# Patient Record
Sex: FEMALE | Race: BLACK OR AFRICAN AMERICAN | NOT HISPANIC OR LATINO | Employment: UNEMPLOYED | URBAN - METROPOLITAN AREA
[De-identification: names, ages, dates, MRNs, and addresses within clinical notes are randomized per-mention and may not be internally consistent; named-entity substitution may affect disease eponyms.]

---

## 2019-09-03 DIAGNOSIS — Z76.82 ORGAN TRANSPLANT CANDIDATE: Primary | ICD-10-CM

## 2019-09-03 PROBLEM — Z21 HUMAN IMMUNODEFICIENCY VIRUS (HIV) INFECTION: Status: ACTIVE | Noted: 2018-02-23

## 2019-09-03 PROBLEM — B20 HUMAN IMMUNODEFICIENCY VIRUS (HIV) INFECTION: Status: ACTIVE | Noted: 2018-02-23

## 2019-09-03 PROBLEM — Z98.890 S/P PARATHYROIDECTOMY: Status: ACTIVE | Noted: 2018-02-23

## 2019-09-03 PROBLEM — Z94.0 HISTORY OF KIDNEY TRANSPLANT: Status: ACTIVE | Noted: 2018-02-23

## 2019-09-03 PROBLEM — K65.9 PERITONITIS: Status: ACTIVE | Noted: 2019-09-03

## 2019-09-03 PROBLEM — N18.6 END-STAGE RENAL DISEASE: Status: ACTIVE | Noted: 2018-03-23

## 2019-09-03 PROBLEM — Z90.89 S/P PARATHYROIDECTOMY: Status: ACTIVE | Noted: 2018-02-23

## 2019-09-11 ENCOUNTER — TELEPHONE (OUTPATIENT)
Dept: TRANSPLANT | Facility: CLINIC | Age: 39
End: 2019-09-11

## 2019-09-17 ENCOUNTER — TELEPHONE (OUTPATIENT)
Dept: TRANSPLANT | Facility: CLINIC | Age: 39
End: 2019-09-17

## 2019-10-07 ENCOUNTER — TELEPHONE (OUTPATIENT)
Dept: TRANSPLANT | Facility: CLINIC | Age: 39
End: 2019-10-07

## 2022-05-02 ENCOUNTER — HOSPITAL ENCOUNTER (EMERGENCY)
Facility: HOSPITAL | Age: 42
Discharge: ANOTHER HOSPITAL | End: 2022-05-02
Attending: EMERGENCY MEDICINE
Payer: MEDICARE

## 2022-05-02 VITALS
SYSTOLIC BLOOD PRESSURE: 79 MMHG | WEIGHT: 165.69 LBS | OXYGEN SATURATION: 96 % | BODY MASS INDEX: 26.01 KG/M2 | HEART RATE: 63 BPM | DIASTOLIC BLOOD PRESSURE: 43 MMHG | HEIGHT: 67 IN | RESPIRATION RATE: 16 BRPM | TEMPERATURE: 98 F

## 2022-05-02 DIAGNOSIS — Z99.2 CHRONIC KIDNEY DISEASE WITH END STAGE RENAL FAILURE ON DIALYSIS: ICD-10-CM

## 2022-05-02 DIAGNOSIS — E86.1 HYPOTENSION DUE TO HYPOVOLEMIA: Primary | ICD-10-CM

## 2022-05-02 DIAGNOSIS — N18.6 CHRONIC KIDNEY DISEASE WITH END STAGE RENAL FAILURE ON DIALYSIS: ICD-10-CM

## 2022-05-02 DIAGNOSIS — E87.6 HYPOKALEMIA: ICD-10-CM

## 2022-05-02 LAB
ALBUMIN SERPL BCP-MCNC: 2.4 G/DL (ref 3.5–5)
ALBUMIN/GLOB SERPL: 0.5 {RATIO}
ALP SERPL-CCNC: 53 U/L (ref 37–98)
ALT SERPL W P-5'-P-CCNC: 10 U/L (ref 13–56)
ANION GAP SERPL CALCULATED.3IONS-SCNC: 13 MMOL/L (ref 7–16)
AST SERPL W P-5'-P-CCNC: 20 U/L (ref 15–37)
BASOPHILS # BLD AUTO: 0.02 K/UL (ref 0–0.2)
BASOPHILS NFR BLD AUTO: 0.4 % (ref 0–1)
BILIRUB SERPL-MCNC: 0.3 MG/DL (ref 0–1.2)
BUN SERPL-MCNC: 24 MG/DL (ref 7–18)
BUN/CREAT SERPL: 2 (ref 6–20)
CALCIUM SERPL-MCNC: 8.9 MG/DL (ref 8.5–10.1)
CHLORIDE SERPL-SCNC: 98 MMOL/L (ref 98–107)
CO2 SERPL-SCNC: 28 MMOL/L (ref 21–32)
CREAT SERPL-MCNC: 13.35 MG/DL (ref 0.55–1.02)
DIFFERENTIAL METHOD BLD: ABNORMAL
EOSINOPHIL # BLD AUTO: 0.49 K/UL (ref 0–0.5)
EOSINOPHIL NFR BLD AUTO: 10.1 % (ref 1–4)
ERYTHROCYTE [DISTWIDTH] IN BLOOD BY AUTOMATED COUNT: 16.9 % (ref 11.5–14.5)
FLUAV AG UPPER RESP QL IA.RAPID: NEGATIVE
FLUBV AG UPPER RESP QL IA.RAPID: NEGATIVE
GLOBULIN SER-MCNC: 4.8 G/DL (ref 2–4)
GLUCOSE SERPL-MCNC: 84 MG/DL (ref 74–106)
HCT VFR BLD AUTO: 38 % (ref 38–47)
HGB BLD-MCNC: 11.8 G/DL (ref 12–16)
IMM GRANULOCYTES # BLD AUTO: 0.02 K/UL (ref 0–0.04)
IMM GRANULOCYTES NFR BLD: 0.4 % (ref 0–0.4)
LACTATE SERPL-SCNC: 1.1 MMOL/L (ref 0.4–2)
LYMPHOCYTES # BLD AUTO: 1.81 K/UL (ref 1–4.8)
LYMPHOCYTES NFR BLD AUTO: 37.3 % (ref 27–41)
MAGNESIUM SERPL-MCNC: 2.3 MG/DL (ref 1.7–2.3)
MCH RBC QN AUTO: 30 PG (ref 27–31)
MCHC RBC AUTO-ENTMCNC: 31.1 G/DL (ref 32–36)
MCV RBC AUTO: 96.7 FL (ref 80–96)
MONOCYTES # BLD AUTO: 0.52 K/UL (ref 0–0.8)
MONOCYTES NFR BLD AUTO: 10.7 % (ref 2–6)
MPC BLD CALC-MCNC: 10.1 FL (ref 9.4–12.4)
NEUTROPHILS # BLD AUTO: 1.99 K/UL (ref 1.8–7.7)
NEUTROPHILS NFR BLD AUTO: 41.1 % (ref 53–65)
PLATELET # BLD AUTO: 232 K/UL (ref 150–400)
POTASSIUM SERPL-SCNC: 2.4 MMOL/L (ref 3.5–5.1)
PROT SERPL-MCNC: 7.2 G/DL (ref 6.4–8.2)
RBC # BLD AUTO: 3.93 M/UL (ref 4.2–5.4)
SARS-COV+SARS-COV-2 AG RESP QL IA.RAPID: NEGATIVE
SODIUM SERPL-SCNC: 137 MMOL/L (ref 136–145)
WBC # BLD AUTO: 4.85 K/UL (ref 4.5–11)

## 2022-05-02 PROCEDURE — 96365 THER/PROPH/DIAG IV INF INIT: CPT

## 2022-05-02 PROCEDURE — 93010 ELECTROCARDIOGRAM REPORT: CPT | Mod: ,,, | Performed by: EMERGENCY MEDICINE

## 2022-05-02 PROCEDURE — 93010 EKG 12-LEAD: ICD-10-PCS | Mod: ,,, | Performed by: EMERGENCY MEDICINE

## 2022-05-02 PROCEDURE — 80053 COMPREHEN METABOLIC PANEL: CPT | Performed by: EMERGENCY MEDICINE

## 2022-05-02 PROCEDURE — 96375 TX/PRO/DX INJ NEW DRUG ADDON: CPT

## 2022-05-02 PROCEDURE — 99284 EMERGENCY DEPT VISIT MOD MDM: CPT | Performed by: EMERGENCY MEDICINE

## 2022-05-02 PROCEDURE — 96361 HYDRATE IV INFUSION ADD-ON: CPT

## 2022-05-02 PROCEDURE — 83735 ASSAY OF MAGNESIUM: CPT | Performed by: EMERGENCY MEDICINE

## 2022-05-02 PROCEDURE — 99285 EMERGENCY DEPT VISIT HI MDM: CPT | Mod: 25,CS

## 2022-05-02 PROCEDURE — 36415 COLL VENOUS BLD VENIPUNCTURE: CPT | Performed by: EMERGENCY MEDICINE

## 2022-05-02 PROCEDURE — 87040 BLOOD CULTURE FOR BACTERIA: CPT | Performed by: EMERGENCY MEDICINE

## 2022-05-02 PROCEDURE — 36592 COLLECT BLOOD FROM PICC: CPT | Performed by: EMERGENCY MEDICINE

## 2022-05-02 PROCEDURE — 25000003 PHARM REV CODE 250: Performed by: EMERGENCY MEDICINE

## 2022-05-02 PROCEDURE — 83605 ASSAY OF LACTIC ACID: CPT | Performed by: EMERGENCY MEDICINE

## 2022-05-02 PROCEDURE — 63600175 PHARM REV CODE 636 W HCPCS: Performed by: EMERGENCY MEDICINE

## 2022-05-02 PROCEDURE — 93005 ELECTROCARDIOGRAM TRACING: CPT

## 2022-05-02 PROCEDURE — 87428 SARSCOV & INF VIR A&B AG IA: CPT | Performed by: EMERGENCY MEDICINE

## 2022-05-02 PROCEDURE — 85025 COMPLETE CBC W/AUTO DIFF WBC: CPT | Performed by: EMERGENCY MEDICINE

## 2022-05-02 RX ORDER — POTASSIUM CHLORIDE 20 MEQ/1
20 TABLET, EXTENDED RELEASE ORAL
Status: COMPLETED | OUTPATIENT
Start: 2022-05-02 | End: 2022-05-02

## 2022-05-02 RX ORDER — POTASSIUM CHLORIDE 20 MEQ/1
20 TABLET, EXTENDED RELEASE ORAL DAILY
Qty: 30 TABLET | Refills: 0 | Status: SHIPPED | OUTPATIENT
Start: 2022-05-02 | End: 2022-05-02 | Stop reason: ALTCHOICE

## 2022-05-02 RX ORDER — ONDANSETRON 2 MG/ML
4 INJECTION INTRAMUSCULAR; INTRAVENOUS
Status: COMPLETED | OUTPATIENT
Start: 2022-05-02 | End: 2022-05-02

## 2022-05-02 RX ORDER — POTASSIUM CHLORIDE 7.45 MG/ML
10 INJECTION INTRAVENOUS
Status: COMPLETED | OUTPATIENT
Start: 2022-05-02 | End: 2022-05-02

## 2022-05-02 RX ORDER — SODIUM CHLORIDE AND POTASSIUM CHLORIDE 150; 900 MG/100ML; MG/100ML
INJECTION, SOLUTION INTRAVENOUS
Status: COMPLETED | OUTPATIENT
Start: 2022-05-02 | End: 2022-05-03

## 2022-05-02 RX ADMIN — SODIUM CHLORIDE AND POTASSIUM CHLORIDE: .9; .15 SOLUTION INTRAVENOUS at 11:05

## 2022-05-02 RX ADMIN — ONDANSETRON 4 MG: 2 INJECTION INTRAMUSCULAR; INTRAVENOUS at 10:05

## 2022-05-02 RX ADMIN — POTASSIUM CHLORIDE 20 MEQ: 20 TABLET, EXTENDED RELEASE ORAL at 10:05

## 2022-05-02 RX ADMIN — SODIUM CHLORIDE 1000 ML: 9 INJECTION, SOLUTION INTRAVENOUS at 08:05

## 2022-05-02 RX ADMIN — POTASSIUM CHLORIDE 10 MEQ: 7.46 INJECTION, SOLUTION INTRAVENOUS at 09:05

## 2022-05-03 NOTE — ED PROVIDER NOTES
Encounter Date: 2022       History     Chief Complaint   Patient presents with    Hypotension     Patient presents with a 45 day history of recurrent nausea sometimes with vomiting.  States her blood pressure is always low in the range of 60-70 systolic and 30-40  diastolic.  She states she does peritoneal dialysis at home and was told she has peritonitis.  She states she is being treated with antibiotics.  States she saw her nephrologist 4 days ago and they did blood cultures and culture of the peritoneal fluid, but she is not feeling any better.  I asked her if something had changed today that prompted her to come to the emergency department and she states there has been no change over the past 45 days in her symptoms.  Patient sees Dr. Zachary Lemos for Nephrology.        Review of patient's allergies indicates:   Allergen Reactions    Ceftazidime Itching    Iron sucrose Itching    Sodium ferric gluconat-sucrose Itching    Cefazolin Itching    Gentamicin Itching     Not allergic     Iron Itching    Soap Hives    Sodium ferric gluconate complex Itching    Sucrose Itching    Adhesive Hives and Rash     No surgical tape    Povidone-iodine Itching, Rash and Hives     Burning to skin  blisters       Past Medical History:   Diagnosis Date    ESRD on peritoneal dialysis     Human immunodeficiency virus (HIV) disease     Renal disorder      Past Surgical History:   Procedure Laterality Date     SECTION      DNC      THYROIDECTOMY       History reviewed. No pertinent family history.  Social History     Tobacco Use    Smoking status: Former Smoker    Smokeless tobacco: Never Used   Substance Use Topics    Alcohol use: Not Currently     Review of Systems   Constitutional: Positive for fatigue. Negative for activity change, appetite change, chills, diaphoresis and fever.   HENT: Negative.    Eyes: Negative.    Respiratory: Negative.    Cardiovascular: Negative.    Gastrointestinal: Positive  for nausea and vomiting. Negative for abdominal distention, abdominal pain, blood in stool, constipation and diarrhea.   Genitourinary: Negative for flank pain, pelvic pain, vaginal bleeding, vaginal discharge and vaginal pain.        Patient does not make any urine.   Musculoskeletal: Negative.    Skin: Negative.    Neurological: Negative.    Hematological: Negative.         Patient states she was anemic in the past but her blood count has been good lately.   Psychiatric/Behavioral: Negative.    All other systems reviewed and are negative.      Physical Exam     Initial Vitals [05/02/22 2020]   BP Pulse Resp Temp SpO2   (!) 68/36 86 16 97.7 °F (36.5 °C) 99 %      MAP       --         Physical Exam    Nursing note and vitals reviewed.  Constitutional: She appears well-developed and well-nourished. She is not diaphoretic. No distress.   HENT:   Head: Normocephalic.   Right Ear: External ear normal.   Left Ear: External ear normal.   Nose: Nose normal.   Mouth/Throat: Oropharynx is clear and moist. No oropharyngeal exudate.   Eyes: Conjunctivae and EOM are normal. Pupils are equal, round, and reactive to light. Right eye exhibits no discharge. Left eye exhibits no discharge. No scleral icterus.   Neck: Neck supple. No tracheal deviation present. No JVD present.   Normal range of motion.  Cardiovascular: Normal rate, regular rhythm, normal heart sounds and intact distal pulses.   No murmur heard.  Pulmonary/Chest: Breath sounds normal. No stridor. No respiratory distress. She has no wheezes. She has no rhonchi. She has no rales.   Abdominal: Abdomen is soft. Bowel sounds are normal. She exhibits no distension and no mass. There is no abdominal tenderness.   Patient has an umbilical hernia which is not reducible but is not tender. There is no rebound and no guarding.   Musculoskeletal:      Cervical back: Normal range of motion and neck supple.     Lymphadenopathy:     She has no cervical adenopathy.   Skin: Skin is  warm and dry. Capillary refill takes less than 2 seconds. No rash noted. No erythema. No pallor.         Medical Screening Exam   See Full Note    ED Course   Procedures  Labs Reviewed   COMPREHENSIVE METABOLIC PANEL - Abnormal; Notable for the following components:       Result Value    Potassium 2.4 (*)     BUN 24 (*)     Creatinine 13.35 (*)     BUN/Creatinine Ratio 2 (*)     Albumin 2.4 (*)     Globulin 4.8 (*)     ALT 10 (*)     eGFR 3 (*)     All other components within normal limits   CBC WITH DIFFERENTIAL - Abnormal; Notable for the following components:    RBC 3.93 (*)     Hemoglobin 11.8 (*)     MCV 96.7 (*)     MCHC 31.1 (*)     RDW 16.9 (*)     Neutrophils % 41.1 (*)     Monocytes % 10.7 (*)     Eosinophils % 10.1 (*)     All other components within normal limits   MAGNESIUM - Normal   LACTIC ACID, PLASMA - Normal   SARS-COV2 (COVID) W/ FLU ANTIGEN - Normal    Narrative:     Negative SARS-CoV results should not be used as the sole basis for treatment or patient management decisions; negative results should be considered in the context of a patient's recent exposures, history and the presene of clinical signs and symptoms consistent with COVID-19.  Negative results should be treated as presumptive and confirmed by molecular assay, if necessary for patient management.   CULTURE, BLOOD   CULTURE, BLOOD   CBC W/ AUTO DIFFERENTIAL    Narrative:     The following orders were created for panel order CBC auto differential.  Procedure                               Abnormality         Status                     ---------                               -----------         ------                     CBC with Differential[[id number]]        Abnormal            Final result                 Please view results for these tests on the individual orders.        ECG Results          EKG 12-lead (Final result)  Result time 05/02/22 22:36:34    Final result by Interface, Lab In [facility] (05/02/22 22:36:34)                  Narrative:    Test Reason : E87.6,    Vent. Rate : 064 BPM     Atrial Rate : 064 BPM     P-R Int : 160 ms          QRS Dur : 090 ms      QT Int : 398 ms       P-R-T Axes : 066 057 062 degrees     QTc Int : 410 ms    Sinus rhythm with occasional Premature ventricular complexes  Low voltage QRS  T wave abnormality, consider inferior ischemia  Abnormal ECG  No previous ECGs available  Confirmed by Donnell Jones DO ([id number]) on 5/2/2022 10:36:24 PM    Referred By: [id number]   SELF           Confirmed By:Donnell Jones DO                            Imaging Results    None          Medications   0.9 % NaCl with KCl 20 mEq infusion (has no administration in time range)   sodium chloride 0.9% bolus 1,000 mL (0 mLs Intravenous Stopped 5/2/22 2201)   potassium chloride 10 mEq in 100 mL IVPB (0 mEq Intravenous Stopped 5/2/22 2235)   potassium chloride SA CR tablet 20 mEq (20 mEq Oral Given 5/2/22 2235)   ondansetron injection 4 mg (4 mg Intravenous Given 5/2/22 2245)     Medical Decision Making:   ED Management:  Patient was given IV potassium and IV fluid.  Attempted to give oral potassium as well and patient vomited and is unable to take oral potassium.  Patient reports she has had the same problem at home where she cannot keep anything down.  At this point patient will need inpatient therapy and consultation with her nephrologist, may need a different form of dialysis or sub correction in her peritoneal dialysis as her creatinine is 13. There is a possibility that the patient is noncompliant at home in doing peritoneal dialysis and may benefit from an alternative therapy.  Defer to Nephrology on these decisions.    Other:   I have discussed this case with another health care provider.       <> Summary of the Discussion: Patient discussed with the transfer Center, accepted by Dr. Whiteside at [facility].                   Clinical Impression:   Final diagnoses:  [E87.6] Hypokalemia  [I95.89, E86.1]  Hypotension due to hypovolemia (Primary)  [N18.6, Z99.2] Chronic kidney disease with end stage renal failure on dialysis          ED Disposition Condition    Transfer to Another Facility Stable              Donnell Jones DO  05/02/22 [id number]       Donnell Jones,   05/02/22 6344

## 2022-05-03 NOTE — ED TRIAGE NOTES
Pt to Er with c/o hypotension states she has been nauseated and vomiting for 1 1/2 months. Pt states she is on peritoneal dialysis and was told she had poradenitis and is on antibiotics but does not know the name of them . Pt states she thinks she is dehydrated pt  Denies pain. Pt does peritoneal dialysis at home.

## 2022-05-05 ENCOUNTER — TELEPHONE (OUTPATIENT)
Dept: EMERGENCY MEDICINE | Facility: HOSPITAL | Age: 42
End: 2022-05-05
Payer: MEDICARE

## 2022-05-09 LAB
BACTERIA BLD CULT: NORMAL
BACTERIA BLD CULT: NORMAL

## 2023-11-02 ENCOUNTER — HOSPITAL ENCOUNTER (INPATIENT)
Facility: HOSPITAL | Age: 43
LOS: 4 days | Discharge: HOME | DRG: 974 | End: 2023-11-07
Attending: EMERGENCY MEDICINE | Admitting: STUDENT IN AN ORGANIZED HEALTH CARE EDUCATION/TRAINING PROGRAM
Payer: MEDICARE

## 2023-11-02 DIAGNOSIS — N18.6 ESRD ON PERITONEAL DIALYSIS: ICD-10-CM

## 2023-11-02 DIAGNOSIS — K21.9 GASTROESOPHAGEAL REFLUX DISEASE, UNSPECIFIED WHETHER ESOPHAGITIS PRESENT: ICD-10-CM

## 2023-11-02 DIAGNOSIS — E89.0 POSTOPERATIVE HYPOTHYROIDISM: ICD-10-CM

## 2023-11-02 DIAGNOSIS — F34.1 PERSISTENT DEPRESSIVE DISORDER: ICD-10-CM

## 2023-11-02 DIAGNOSIS — Z99.2 ESRD ON PERITONEAL DIALYSIS: ICD-10-CM

## 2023-11-02 DIAGNOSIS — B37.0 ORAL THRUSH: ICD-10-CM

## 2023-11-02 DIAGNOSIS — K65.9 PERITONITIS: Primary | ICD-10-CM

## 2023-11-02 DIAGNOSIS — B20 HUMAN IMMUNODEFICIENCY VIRUS (HIV) DISEASE: ICD-10-CM

## 2023-11-02 DIAGNOSIS — A41.9 SEVERE SEPSIS: ICD-10-CM

## 2023-11-02 DIAGNOSIS — R65.20 SEVERE SEPSIS: ICD-10-CM

## 2023-11-02 LAB
ALBUMIN SERPL BCP-MCNC: 2.2 G/DL (ref 3.5–5)
ALBUMIN/GLOB SERPL: 0.4 {RATIO}
ALP SERPL-CCNC: 58 U/L (ref 37–98)
ALT SERPL W P-5'-P-CCNC: 10 U/L (ref 13–56)
ANION GAP SERPL CALCULATED.3IONS-SCNC: 12 MMOL/L (ref 7–16)
AST SERPL W P-5'-P-CCNC: 8 U/L (ref 15–37)
BASOPHILS # BLD AUTO: 0.02 K/UL (ref 0–0.2)
BASOPHILS NFR BLD AUTO: 0.2 % (ref 0–1)
BILIRUB SERPL-MCNC: 0.3 MG/DL (ref ?–1.2)
BUN SERPL-MCNC: 36 MG/DL (ref 7–18)
BUN/CREAT SERPL: 2 (ref 6–20)
CALCIUM SERPL-MCNC: 7.7 MG/DL (ref 8.5–10.1)
CHLORIDE SERPL-SCNC: 96 MMOL/L (ref 98–107)
CLARITY FLD: ABNORMAL
CO2 SERPL-SCNC: 31 MMOL/L (ref 21–32)
COLOR FLD: ABNORMAL
CREAT SERPL-MCNC: 15 MG/DL (ref 0.55–1.02)
DIFFERENTIAL METHOD BLD: ABNORMAL
EGFR (NO RACE VARIABLE) (RUSH/TITUS): 3 ML/MIN/1.73M2
EOSINOPHIL # BLD AUTO: 0.18 K/UL (ref 0–0.5)
EOSINOPHIL NFR BLD AUTO: 2 % (ref 1–4)
EOSINOPHIL NFR FLD MANUAL: 10 %
ERYTHROCYTE [DISTWIDTH] IN BLOOD BY AUTOMATED COUNT: 16.6 % (ref 11.5–14.5)
GLOBULIN SER-MCNC: 5.9 G/DL (ref 2–4)
GLUCOSE SERPL-MCNC: 90 MG/DL (ref 74–106)
GRANULOCYTES NFR FLD MANUAL: 72 % (ref 0–25)
HCT VFR BLD AUTO: 35.4 % (ref 38–47)
HGB BLD-MCNC: 11.1 G/DL (ref 12–16)
IMM GRANULOCYTES # BLD AUTO: 0.04 K/UL (ref 0–0.04)
IMM GRANULOCYTES NFR BLD: 0.5 % (ref 0–0.4)
LIPASE SERPL-CCNC: 51 U/L (ref 16–77)
LYMPHOCYTES # BLD AUTO: 0.72 K/UL (ref 1–4.8)
LYMPHOCYTES NFR BLD AUTO: 8.1 % (ref 27–41)
LYMPHOCYTES NFR FLD MANUAL: 11 %
MCH RBC QN AUTO: 26.4 PG (ref 27–31)
MCHC RBC AUTO-ENTMCNC: 31.4 G/DL (ref 32–36)
MCV RBC AUTO: 84.1 FL (ref 80–96)
MONOCYTES # BLD AUTO: 0.53 K/UL (ref 0–0.8)
MONOCYTES NFR BLD AUTO: 6 % (ref 2–6)
MONOCYTES NFR FLD MANUAL: 7 %
MPC BLD CALC-MCNC: 9.5 FL (ref 9.4–12.4)
NEUTROPHILS # BLD AUTO: 7.38 K/UL (ref 1.8–7.7)
NEUTROPHILS NFR BLD AUTO: 83.2 % (ref 53–65)
NRBC # BLD AUTO: 0 X10E3/UL
NRBC, AUTO (.00): 0 %
PLATELET # BLD AUTO: 204 K/UL (ref 150–400)
POTASSIUM SERPL-SCNC: 2.7 MMOL/L (ref 3.5–5.1)
PROT SERPL-MCNC: 8.1 G/DL (ref 6.4–8.2)
RBC # BLD AUTO: 4.21 M/UL (ref 4.2–5.4)
RBC # FLD MANUAL: <3000 /CUMM
SODIUM SERPL-SCNC: 136 MMOL/L (ref 136–145)
WBC # BLD AUTO: 8.87 K/UL (ref 4.5–11)
WBC # FLD MANUAL: 3589 /CUMM

## 2023-11-02 PROCEDURE — 87070 CULTURE OTHR SPECIMN AEROBIC: CPT | Performed by: EMERGENCY MEDICINE

## 2023-11-02 PROCEDURE — 83735 ASSAY OF MAGNESIUM: CPT | Performed by: EMERGENCY MEDICINE

## 2023-11-02 PROCEDURE — 99285 EMERGENCY DEPT VISIT HI MDM: CPT | Mod: ,,, | Performed by: EMERGENCY MEDICINE

## 2023-11-02 PROCEDURE — 88112 CYTOPATH CELL ENHANCE TECH: CPT | Mod: 26,,, | Performed by: PATHOLOGY

## 2023-11-02 PROCEDURE — 80053 COMPREHEN METABOLIC PANEL: CPT | Performed by: EMERGENCY MEDICINE

## 2023-11-02 PROCEDURE — 85025 COMPLETE CBC W/AUTO DIFF WBC: CPT | Performed by: EMERGENCY MEDICINE

## 2023-11-02 PROCEDURE — 99285 EMERGENCY DEPT VISIT HI MDM: CPT

## 2023-11-02 PROCEDURE — 89051 BODY FLUID CELL COUNT: CPT | Performed by: EMERGENCY MEDICINE

## 2023-11-02 PROCEDURE — 88305 TISSUE EXAM BY PATHOLOGIST: CPT | Mod: 26,,, | Performed by: PATHOLOGY

## 2023-11-02 PROCEDURE — 88112 NON-GYN CYTOLOGY: ICD-10-PCS | Mod: 26,,, | Performed by: PATHOLOGY

## 2023-11-02 PROCEDURE — 89050 BODY FLUID CELL COUNT: CPT | Performed by: EMERGENCY MEDICINE

## 2023-11-02 PROCEDURE — 83690 ASSAY OF LIPASE: CPT | Performed by: EMERGENCY MEDICINE

## 2023-11-02 PROCEDURE — 88305 NON-GYN CYTOLOGY: ICD-10-PCS | Mod: 26,,, | Performed by: PATHOLOGY

## 2023-11-02 PROCEDURE — 88305 TISSUE EXAM BY PATHOLOGIST: CPT | Mod: TC,MCY | Performed by: EMERGENCY MEDICINE

## 2023-11-02 PROCEDURE — 96374 THER/PROPH/DIAG INJ IV PUSH: CPT

## 2023-11-02 PROCEDURE — 99285 PR EMERGENCY DEPT VISIT,LEVEL V: ICD-10-PCS | Mod: ,,, | Performed by: EMERGENCY MEDICINE

## 2023-11-02 NOTE — LETTER
November 7, 2023    Marian Tabares  [address] [place] [address] [address] [id number]                   [address] [place] [id number]  Phone: [phone]  Fax: [phone]   November 7, 2023     Patient: Marian Tabares   YOB: 1980   Date of Visit: 11/2/2023       To Whom it May Concern:    Marian Tabares was seen at [facility] on 11/2/2023. Her son Ivy Vieira has been here with pt since admission and may return to work on 11/09/2023 .    Please excuse him from any work missed.    If you have any questions or concerns, please don't hesitate to call.    Sincerely,         Daniel Lees RN

## 2023-11-03 ENCOUNTER — ANESTHESIA EVENT (OUTPATIENT)
Dept: SURGERY | Facility: HOSPITAL | Age: 43
DRG: 974 | End: 2023-11-03
Payer: MEDICARE

## 2023-11-03 PROBLEM — R65.20 SEVERE SEPSIS: Status: ACTIVE | Noted: 2023-11-03

## 2023-11-03 PROBLEM — K21.9 GERD (GASTROESOPHAGEAL REFLUX DISEASE): Status: ACTIVE | Noted: 2023-11-03

## 2023-11-03 PROBLEM — K65.9 PERITONITIS: Status: ACTIVE | Noted: 2023-11-03

## 2023-11-03 PROBLEM — E89.0 POSTOPERATIVE HYPOTHYROIDISM: Status: ACTIVE | Noted: 2023-11-03

## 2023-11-03 PROBLEM — I95.9 HYPOTENSION: Status: ACTIVE | Noted: 2023-11-03

## 2023-11-03 PROBLEM — F32.A DEPRESSION: Status: ACTIVE | Noted: 2023-11-03

## 2023-11-03 PROBLEM — A41.9 SEVERE SEPSIS: Status: ACTIVE | Noted: 2023-11-03

## 2023-11-03 PROBLEM — E87.8 ELECTROLYTE ABNORMALITY: Status: ACTIVE | Noted: 2023-11-03

## 2023-11-03 LAB
APTT PPP: 34.7 SECONDS (ref 25.2–37.3)
INR BLD: 1.12
LACTATE SERPL-SCNC: 0.4 MMOL/L (ref 0.4–2)
MAGNESIUM SERPL-MCNC: 1.1 MG/DL (ref 1.7–2.3)
MAGNESIUM SERPL-MCNC: 1.3 MG/DL (ref 1.7–2.3)
POTASSIUM SERPL-SCNC: 2.8 MMOL/L (ref 3.5–5.1)
PROTHROMBIN TIME: 14.3 SECONDS (ref 11.7–14.7)
SARS-COV-2 RDRP RESP QL NAA+PROBE: NEGATIVE
TSH SERPL DL<=0.005 MIU/L-ACNC: 173 UIU/ML (ref 0.36–3.74)

## 2023-11-03 PROCEDURE — 94761 N-INVAS EAR/PLS OXIMETRY MLT: CPT

## 2023-11-03 PROCEDURE — 83735 ASSAY OF MAGNESIUM: CPT

## 2023-11-03 PROCEDURE — 99223 1ST HOSP IP/OBS HIGH 75: CPT | Mod: ,,, | Performed by: HOSPITALIST

## 2023-11-03 PROCEDURE — 83605 ASSAY OF LACTIC ACID: CPT

## 2023-11-03 PROCEDURE — 25000003 PHARM REV CODE 250

## 2023-11-03 PROCEDURE — 99223 1ST HOSP IP/OBS HIGH 75: CPT | Mod: ,,, | Performed by: STUDENT IN AN ORGANIZED HEALTH CARE EDUCATION/TRAINING PROGRAM

## 2023-11-03 PROCEDURE — 63600175 PHARM REV CODE 636 W HCPCS: Performed by: STUDENT IN AN ORGANIZED HEALTH CARE EDUCATION/TRAINING PROGRAM

## 2023-11-03 PROCEDURE — 63700000 PHARM REV CODE 250 ALT 637 W/O HCPCS: Performed by: STUDENT IN AN ORGANIZED HEALTH CARE EDUCATION/TRAINING PROGRAM

## 2023-11-03 PROCEDURE — 99223 PR INITIAL HOSPITAL CARE,LEVL III: ICD-10-PCS | Mod: ,,, | Performed by: STUDENT IN AN ORGANIZED HEALTH CARE EDUCATION/TRAINING PROGRAM

## 2023-11-03 PROCEDURE — 11000001 HC ACUTE MED/SURG PRIVATE ROOM

## 2023-11-03 PROCEDURE — 99223 PR INITIAL HOSPITAL CARE,LEVL III: ICD-10-PCS | Mod: ,,, | Performed by: HOSPITALIST

## 2023-11-03 PROCEDURE — 63600175 PHARM REV CODE 636 W HCPCS: Performed by: EMERGENCY MEDICINE

## 2023-11-03 PROCEDURE — 25000003 PHARM REV CODE 250: Performed by: HOSPITALIST

## 2023-11-03 PROCEDURE — 25000003 PHARM REV CODE 250: Performed by: STUDENT IN AN ORGANIZED HEALTH CARE EDUCATION/TRAINING PROGRAM

## 2023-11-03 PROCEDURE — 84132 ASSAY OF SERUM POTASSIUM: CPT

## 2023-11-03 PROCEDURE — 85730 THROMBOPLASTIN TIME PARTIAL: CPT

## 2023-11-03 PROCEDURE — 87635 SARS-COV-2 COVID-19 AMP PRB: CPT | Performed by: EMERGENCY MEDICINE

## 2023-11-03 PROCEDURE — 86360 T CELL ABSOLUTE COUNT/RATIO: CPT | Mod: 90

## 2023-11-03 PROCEDURE — 25000003 PHARM REV CODE 250: Performed by: EMERGENCY MEDICINE

## 2023-11-03 PROCEDURE — 87040 BLOOD CULTURE FOR BACTERIA: CPT

## 2023-11-03 PROCEDURE — 63600175 PHARM REV CODE 636 W HCPCS: Performed by: HOSPITALIST

## 2023-11-03 PROCEDURE — 84443 ASSAY THYROID STIM HORMONE: CPT

## 2023-11-03 RX ORDER — FAMOTIDINE 20 MG/1
20 TABLET, FILM COATED ORAL NIGHTLY
Status: DISCONTINUED | OUTPATIENT
Start: 2023-11-03 | End: 2023-11-03

## 2023-11-03 RX ORDER — LEVOFLOXACIN 500 MG/1
500 TABLET, FILM COATED ORAL DAILY
Status: DISCONTINUED | OUTPATIENT
Start: 2023-11-03 | End: 2023-11-03

## 2023-11-03 RX ORDER — DORAVIRINE 100 MG/1
1 TABLET, FILM COATED ORAL DAILY
COMMUNITY
Start: 2023-08-31

## 2023-11-03 RX ORDER — POTASSIUM CHLORIDE 20 MEQ/1
40 TABLET, EXTENDED RELEASE ORAL ONCE
Status: COMPLETED | OUTPATIENT
Start: 2023-11-03 | End: 2023-11-03

## 2023-11-03 RX ORDER — DOLUTEGRAVIR SODIUM 50 MG/1
1 TABLET, FILM COATED ORAL DAILY
COMMUNITY
Start: 2023-08-31

## 2023-11-03 RX ORDER — HYDROCODONE BITARTRATE AND ACETAMINOPHEN 7.5; 325 MG/1; MG/1
1 TABLET ORAL
COMMUNITY
Start: 2023-11-02

## 2023-11-03 RX ORDER — MIDODRINE HYDROCHLORIDE 5 MG/1
5 TABLET ORAL EVERY 8 HOURS
Status: DISCONTINUED | OUTPATIENT
Start: 2023-11-03 | End: 2023-11-03

## 2023-11-03 RX ORDER — TRAZODONE HYDROCHLORIDE 50 MG/1
50 TABLET ORAL NIGHTLY PRN
Status: DISCONTINUED | OUTPATIENT
Start: 2023-11-03 | End: 2023-11-07 | Stop reason: HOSPADM

## 2023-11-03 RX ORDER — MAGNESIUM SULFATE HEPTAHYDRATE 40 MG/ML
2 INJECTION, SOLUTION INTRAVENOUS ONCE
Status: COMPLETED | OUTPATIENT
Start: 2023-11-03 | End: 2023-11-03

## 2023-11-03 RX ORDER — FLUCONAZOLE 100 MG/1
100 TABLET ORAL DAILY
Status: DISCONTINUED | OUTPATIENT
Start: 2023-11-03 | End: 2023-11-03

## 2023-11-03 RX ORDER — LEVOTHYROXINE SODIUM 150 UG/1
150 TABLET ORAL
Status: DISCONTINUED | OUTPATIENT
Start: 2023-11-03 | End: 2023-11-04

## 2023-11-03 RX ORDER — SIMETHICONE 80 MG
1 TABLET,CHEWABLE ORAL 3 TIMES DAILY PRN
Status: DISCONTINUED | OUTPATIENT
Start: 2023-11-03 | End: 2023-11-07 | Stop reason: HOSPADM

## 2023-11-03 RX ORDER — LEVOTHYROXINE SODIUM 150 UG/1
150 TABLET ORAL
Status: ON HOLD | COMMUNITY
End: 2023-11-07 | Stop reason: HOSPADM

## 2023-11-03 RX ORDER — FLUCONAZOLE 100 MG/1
200 TABLET ORAL DAILY
Status: DISCONTINUED | OUTPATIENT
Start: 2023-11-03 | End: 2023-11-07 | Stop reason: HOSPADM

## 2023-11-03 RX ORDER — MIDODRINE HYDROCHLORIDE 10 MG/1
10 TABLET ORAL
COMMUNITY

## 2023-11-03 RX ORDER — ONDANSETRON 2 MG/ML
8 INJECTION INTRAMUSCULAR; INTRAVENOUS EVERY 6 HOURS PRN
Status: DISCONTINUED | OUTPATIENT
Start: 2023-11-03 | End: 2023-11-07 | Stop reason: HOSPADM

## 2023-11-03 RX ORDER — LEVOFLOXACIN 250 MG/1
250 TABLET ORAL
Status: DISCONTINUED | OUTPATIENT
Start: 2023-11-05 | End: 2023-11-03

## 2023-11-03 RX ORDER — GUAIFENESIN/DEXTROMETHORPHAN 100-10MG/5
10 SYRUP ORAL EVERY 6 HOURS PRN
Status: DISCONTINUED | OUTPATIENT
Start: 2023-11-03 | End: 2023-11-07 | Stop reason: HOSPADM

## 2023-11-03 RX ORDER — ACETAMINOPHEN 500 MG
1000 TABLET ORAL EVERY 6 HOURS PRN
Status: DISCONTINUED | OUTPATIENT
Start: 2023-11-03 | End: 2023-11-07 | Stop reason: HOSPADM

## 2023-11-03 RX ORDER — MIDODRINE HYDROCHLORIDE 5 MG/1
10 TABLET ORAL EVERY 8 HOURS
Status: DISCONTINUED | OUTPATIENT
Start: 2023-11-03 | End: 2023-11-07 | Stop reason: HOSPADM

## 2023-11-03 RX ORDER — ESCITALOPRAM OXALATE 20 MG/1
20 TABLET ORAL
COMMUNITY
Start: 2023-08-31

## 2023-11-03 RX ORDER — METRONIDAZOLE 500 MG/100ML
500 INJECTION, SOLUTION INTRAVENOUS
Status: DISCONTINUED | OUTPATIENT
Start: 2023-11-03 | End: 2023-11-03

## 2023-11-03 RX ORDER — FAMOTIDINE 20 MG/1
20 TABLET, FILM COATED ORAL NIGHTLY
COMMUNITY
Start: 2023-08-31

## 2023-11-03 RX ORDER — LEVOFLOXACIN 500 MG/1
500 TABLET, FILM COATED ORAL
Status: COMPLETED | OUTPATIENT
Start: 2023-11-03 | End: 2023-11-03

## 2023-11-03 RX ORDER — MAGNESIUM SULFATE 1 G/100ML
1 INJECTION INTRAVENOUS
Status: COMPLETED | OUTPATIENT
Start: 2023-11-03 | End: 2023-11-03

## 2023-11-03 RX ORDER — ASPIRIN 81 MG/1
81 TABLET ORAL DAILY
COMMUNITY

## 2023-11-03 RX ORDER — ESCITALOPRAM OXALATE 10 MG/1
20 TABLET ORAL DAILY
Status: DISCONTINUED | OUTPATIENT
Start: 2023-11-03 | End: 2023-11-07 | Stop reason: HOSPADM

## 2023-11-03 RX ORDER — FAMOTIDINE 20 MG/1
20 TABLET, FILM COATED ORAL NIGHTLY
Status: DISCONTINUED | OUTPATIENT
Start: 2023-11-03 | End: 2023-11-07 | Stop reason: HOSPADM

## 2023-11-03 RX ORDER — MORPHINE SULFATE 4 MG/ML
4 INJECTION, SOLUTION INTRAMUSCULAR; INTRAVENOUS EVERY 4 HOURS PRN
Status: DISCONTINUED | OUTPATIENT
Start: 2023-11-03 | End: 2023-11-07 | Stop reason: HOSPADM

## 2023-11-03 RX ORDER — OXYCODONE HYDROCHLORIDE 5 MG/1
10 TABLET ORAL EVERY 4 HOURS PRN
Status: DISCONTINUED | OUTPATIENT
Start: 2023-11-03 | End: 2023-11-07 | Stop reason: HOSPADM

## 2023-11-03 RX ORDER — LEVOTHYROXINE SODIUM 150 UG/1
150 TABLET ORAL
Status: DISCONTINUED | OUTPATIENT
Start: 2023-11-03 | End: 2023-11-03

## 2023-11-03 RX ORDER — FLUCONAZOLE 100 MG/1
100 TABLET ORAL DAILY
Status: ON HOLD | COMMUNITY
Start: 2023-10-31 | End: 2023-11-07 | Stop reason: HOSPADM

## 2023-11-03 RX ORDER — BISACODYL 5 MG
10 TABLET, DELAYED RELEASE (ENTERIC COATED) ORAL DAILY PRN
Status: DISCONTINUED | OUTPATIENT
Start: 2023-11-03 | End: 2023-11-07 | Stop reason: HOSPADM

## 2023-11-03 RX ADMIN — LEVOTHYROXINE SODIUM 150 MCG: 0.15 TABLET ORAL at 05:11

## 2023-11-03 RX ADMIN — METRONIDAZOLE 500 MG: 500 INJECTION, SOLUTION INTRAVENOUS at 06:11

## 2023-11-03 RX ADMIN — MAGNESIUM SULFATE IN DEXTROSE 1 G: 10 INJECTION, SOLUTION INTRAVENOUS at 12:11

## 2023-11-03 RX ADMIN — ESCITALOPRAM 20 MG: 10 TABLET, FILM COATED ORAL at 08:11

## 2023-11-03 RX ADMIN — SODIUM CHLORIDE 250 ML: 9 INJECTION, SOLUTION INTRAVENOUS at 02:11

## 2023-11-03 RX ADMIN — MIDODRINE HYDROCHLORIDE 10 MG: 5 TABLET ORAL at 01:11

## 2023-11-03 RX ADMIN — MIDODRINE HYDROCHLORIDE 10 MG: 5 TABLET ORAL at 05:11

## 2023-11-03 RX ADMIN — FLUCONAZOLE 200 MG: 100 TABLET ORAL at 01:11

## 2023-11-03 RX ADMIN — MIDODRINE HYDROCHLORIDE 10 MG: 5 TABLET ORAL at 08:11

## 2023-11-03 RX ADMIN — SODIUM CHLORIDE 250 ML: 9 INJECTION, SOLUTION INTRAVENOUS at 06:11

## 2023-11-03 RX ADMIN — OXYCODONE HYDROCHLORIDE 10 MG: 5 TABLET ORAL at 03:11

## 2023-11-03 RX ADMIN — FAMOTIDINE 20 MG: 20 TABLET ORAL at 09:11

## 2023-11-03 RX ADMIN — AZTREONAM 500 MG: 1 INJECTION, POWDER, LYOPHILIZED, FOR SOLUTION INTRAMUSCULAR; INTRAVENOUS at 08:11

## 2023-11-03 RX ADMIN — VANCOMYCIN HYDROCHLORIDE 1500 MG: 5 INJECTION, POWDER, LYOPHILIZED, FOR SOLUTION INTRAVENOUS at 09:11

## 2023-11-03 RX ADMIN — MORPHINE SULFATE 4 MG: 4 INJECTION, SOLUTION INTRAMUSCULAR; INTRAVENOUS at 01:11

## 2023-11-03 RX ADMIN — LEVOFLOXACIN 500 MG: 500 TABLET, FILM COATED ORAL at 01:11

## 2023-11-03 RX ADMIN — MAGNESIUM SULFATE HEPTAHYDRATE 2 G: 40 INJECTION, SOLUTION INTRAVENOUS at 09:11

## 2023-11-03 RX ADMIN — ONDANSETRON 8 MG: 2 INJECTION INTRAMUSCULAR; INTRAVENOUS at 11:11

## 2023-11-03 RX ADMIN — POTASSIUM CHLORIDE 40 MEQ: 1500 TABLET, EXTENDED RELEASE ORAL at 11:11

## 2023-11-03 NOTE — ED NOTES
Pt placed on hospital bed for comfort. Bed in low postion call bell in reach. Pt also asked for pain medicine. See MAR for details.

## 2023-11-03 NOTE — SUBJECTIVE & OBJECTIVE
No current facility-administered medications on file prior to encounter.     Current Outpatient Medications on File Prior to Encounter   Medication Sig    aspirin (ECOTRIN) 81 MG EC tablet Take 81 mg by mouth once daily.    EScitalopram oxalate (LEXAPRO) 20 MG tablet Take 20 mg by mouth.    famotidine (PEPCID) 20 MG tablet Take 20 mg by mouth every evening.    fluconazole (DIFLUCAN) 100 MG tablet Take 100 mg by mouth once daily. Take for 7 days    HYDROcodone-acetaminophen (NORCO) 7.5-325 mg per tablet Take 1 tablet by mouth.    levothyroxine (SYNTHROID) 150 MCG tablet Take 150 mcg by mouth.    midodrine (PROAMATINE) 10 MG tablet Take 10 mg by mouth.    PIFELTRO 100 mg Tab Take 1 tablet by mouth once daily.    TIVICAY 50 mg Tab Take 1 tablet by mouth once daily. Take with pifeltro       Review of patient's allergies indicates:   Allergen Reactions    Ceftazidime Itching    Iron sucrose Itching    Sodium ferric gluconat-sucrose Itching    Cefazolin Itching    Gentamicin Itching     Not allergic     Iron Itching    Soap Hives    Sodium ferric gluconate complex Itching    Sucrose Itching    Adhesive Hives and Rash     No surgical tape    Povidone-iodine Itching, Rash and Hives     Burning to skin  blisters         Past Medical History:   Diagnosis Date    Digestive disorder     ESRD on peritoneal dialysis     Human immunodeficiency virus (HIV) disease     Hypothyroidism      Past Surgical History:   Procedure Laterality Date     SECTION      KIDNEY TRANSPLANT  2015    [facility]    THYROIDECTOMY       Family History       Problem Relation (Age of Onset)    Cancer Mother    Depression Sister    Gout Son    Heart disease Father    No Known Problems Brother    Vision loss Mother          Tobacco Use    Smoking status: Former    Smokeless tobacco: Never   Substance and Sexual Activity    Alcohol use: Not Currently    Drug use: Not on file    Sexual activity: Not on file     Review of Systems   Respiratory:  Negative  for chest tightness and shortness of breath.    Cardiovascular:  Negative for chest pain.   Gastrointestinal:  Positive for abdominal distention and abdominal pain. Negative for nausea and vomiting.   All other systems reviewed and are negative.    Objective:     Vital Signs (Most Recent):  Temp: 97.9 °F (36.6 °C) (11/03/23 1332)  Pulse: 70 (11/03/23 1332)  Resp: 18 (11/03/23 1332)  BP: (!) 78/49 (11/03/23 1332)  SpO2: 98 % (11/03/23 1332) Vital Signs (24h Range):  Temp:  [97.6 °F (36.4 °C)-97.9 °F (36.6 °C)] 97.9 °F (36.6 °C)  Pulse:  [70-91] 70  Resp:  [9-26] 18  SpO2:  [93 %-100 %] 98 %  BP: ()/(41-68) 78/49     Weight: 77.6 kg (171 lb)  Body mass index is 26.78 kg/m².     Physical Exam  Vitals reviewed.   HENT:      Head: Normocephalic and atraumatic.   Eyes:      Extraocular Movements: Extraocular movements intact.   Cardiovascular:      Rate and Rhythm: Normal rate.      Pulses: Normal pulses.   Pulmonary:      Effort: Pulmonary effort is normal.   Abdominal:      General: There is distension.      Tenderness: There is abdominal tenderness.   Musculoskeletal:         General: No swelling. Normal range of motion.   Skin:     General: Skin is warm and dry.      Capillary Refill: Capillary refill takes less than 2 seconds.   Neurological:      General: No focal deficit present.      Mental Status: She is alert and oriented to person, place, and time.   Psychiatric:         Mood and Affect: Mood normal.         Behavior: Behavior normal.            I have reviewed all pertinent lab results within the past 24 hours.    Significant Diagnostics:  I have reviewed all pertinent imaging results/findings within the past 24 hours.

## 2023-11-03 NOTE — CONSULTS
[facility]  General Surgery  Consult Note    Patient Name: Marian Tabares  MRN: [id number]  Code Status: Full Code  Admission Date: 11/2/2023  Hospital Length of Stay: 0 days  Attending Physician: Aden Cornejo DO  Primary Care Provider: Darlene Primary Doctor    Patient information was obtained from patient, past medical records and ER records.     Inpatient consult to General Surgery  Consult performed by: Wilmer Harris ACNP  Consult ordered by: Zachary Lemos MD  Reason for consult: placement of tunneled HD cath and removal of perotineal cath      Subjective:     Principal Problem: Severe sepsis    History of Present Illness: 43-year-old female with a past medical history of HIV, hypothyroidism and end-stage renal disease who was seen in consult for infected PD catheter.  Patient is followed by Dr. Lemos, nephrologist and was seen in his clinic last week.  Peritoneal fluid showed fungal peritonitis.  Patient was admitted last night from the ED after presenting with abdominal pain and hypotension.  Plan for PD cath removal in OR tomorrow.  Patient currently treated with IV antibiotics managed by primary.          No current facility-administered medications on file prior to encounter.     Current Outpatient Medications on File Prior to Encounter   Medication Sig    aspirin (ECOTRIN) 81 MG EC tablet Take 81 mg by mouth once daily.    EScitalopram oxalate (LEXAPRO) 20 MG tablet Take 20 mg by mouth.    famotidine (PEPCID) 20 MG tablet Take 20 mg by mouth every evening.    fluconazole (DIFLUCAN) 100 MG tablet Take 100 mg by mouth once daily. Take for 7 days    HYDROcodone-acetaminophen (NORCO) 7.5-325 mg per tablet Take 1 tablet by mouth.    levothyroxine (SYNTHROID) 150 MCG tablet Take 150 mcg by mouth.    midodrine (PROAMATINE) 10 MG tablet Take 10 mg by mouth.    PIFELTRO 100 mg Tab Take 1 tablet by mouth once daily.    TIVICAY 50 mg Tab Take 1 tablet by mouth once daily. Take with  pifeltro       Review of patient's allergies indicates:   Allergen Reactions    Ceftazidime Itching    Iron sucrose Itching    Sodium ferric gluconat-sucrose Itching    Cefazolin Itching    Gentamicin Itching     Not allergic     Iron Itching    Soap Hives    Sodium ferric gluconate complex Itching    Sucrose Itching    Adhesive Hives and Rash     No surgical tape    Povidone-iodine Itching, Rash and Hives     Burning to skin  blisters         Past Medical History:   Diagnosis Date    Digestive disorder     ESRD on peritoneal dialysis     Human immunodeficiency virus (HIV) disease     Hypothyroidism      Past Surgical History:   Procedure Laterality Date     SECTION      KIDNEY TRANSPLANT  2015    [facility]    THYROIDECTOMY       Family History       Problem Relation (Age of Onset)    Cancer Mother    Depression Sister    Gout Son    Heart disease Father    No Known Problems Brother    Vision loss Mother          Tobacco Use    Smoking status: Former    Smokeless tobacco: Never   Substance and Sexual Activity    Alcohol use: Not Currently    Drug use: Not on file    Sexual activity: Not on file     Review of Systems   Respiratory:  Negative for chest tightness and shortness of breath.    Cardiovascular:  Negative for chest pain.   Gastrointestinal:  Positive for abdominal distention and abdominal pain. Negative for nausea and vomiting.   All other systems reviewed and are negative.    Objective:     Vital Signs (Most Recent):  Temp: 97.9 °F (36.6 °C) (23 1332)  Pulse: 70 (23 1332)  Resp: 18 (23 1332)  BP: (!) 78/49 (23 1332)  SpO2: 98 % (23 1332) Vital Signs (24h Range):  Temp:  [97.6 °F (36.4 °C)-97.9 °F (36.6 °C)] 97.9 °F (36.6 °C)  Pulse:  [70-91] 70  Resp:  [9-26] 18  SpO2:  [93 %-100 %] 98 %  BP: ()/(41-68) 78/49     Weight: 77.6 kg (171 lb)  Body mass index is 26.78 kg/m².     Physical Exam  Vitals reviewed.   HENT:      Head: Normocephalic and atraumatic.   Eyes:       Extraocular Movements: Extraocular movements intact.   Cardiovascular:      Rate and Rhythm: Normal rate.      Pulses: Normal pulses.   Pulmonary:      Effort: Pulmonary effort is normal.   Abdominal:      General: There is distension.      Tenderness: There is abdominal tenderness.   Musculoskeletal:         General: No swelling. Normal range of motion.   Skin:     General: Skin is warm and dry.      Capillary Refill: Capillary refill takes less than 2 seconds.   Neurological:      General: No focal deficit present.      Mental Status: She is alert and oriented to person, place, and time.   Psychiatric:         Mood and Affect: Mood normal.         Behavior: Behavior normal.            I have reviewed all pertinent lab results within the past 24 hours.    Significant Diagnostics:  I have reviewed all pertinent imaging results/findings within the past 24 hours.      Assessment/Plan:     Peritonitis  11/4: Plan for PD cath removal and placement of tunneled HD cath in OR tomorrow.  Patient currently treated with IV antibiotics managed by primary. Risk benefits of surgical interventions explained in detail. Pateint agrees to proceed.         VTE Risk Mitigation (From admission, onward)           Ordered     Place sequential compression device  Until discontinued         11/03/23 0829                    Thank you for your consult. I will follow-up with patient. Please contact us if you have any additional questions.    Wilmer Harris, AMRIKP  General Surgery  [facility]

## 2023-11-03 NOTE — ASSESSMENT & PLAN NOTE
Patient with history of Grave Disease post thyroidectomy  Pending TSH  Adjust the dose of home levothyroxine as indicated

## 2023-11-03 NOTE — SUBJECTIVE & OBJECTIVE
Past Medical History:   Diagnosis Date    Digestive disorder     ESRD on peritoneal dialysis     Human immunodeficiency virus (HIV) disease     Hypothyroidism        Past Surgical History:   Procedure Laterality Date     SECTION      KIDNEY TRANSPLANT  2015    [facility]    THYROIDECTOMY         Review of patient's allergies indicates:   Allergen Reactions    Ceftazidime Itching    Iron sucrose Itching    Sodium ferric gluconat-sucrose Itching    Cefazolin Itching    Gentamicin Itching     Not allergic     Iron Itching    Soap Hives    Sodium ferric gluconate complex Itching    Sucrose Itching    Adhesive Hives and Rash     No surgical tape    Povidone-iodine Itching, Rash and Hives     Burning to skin  blisters         No current facility-administered medications on file prior to encounter.     Current Outpatient Medications on File Prior to Encounter   Medication Sig    aspirin (ECOTRIN) 81 MG EC tablet Take 81 mg by mouth once daily.    EScitalopram oxalate (LEXAPRO) 20 MG tablet Take 20 mg by mouth.    famotidine (PEPCID) 20 MG tablet Take 20 mg by mouth every evening.    fluconazole (DIFLUCAN) 100 MG tablet Take 100 mg by mouth once daily. Take for 7 days    HYDROcodone-acetaminophen (NORCO) 7.5-325 mg per tablet Take 1 tablet by mouth.    levothyroxine (SYNTHROID) 150 MCG tablet Take 150 mcg by mouth.    midodrine (PROAMATINE) 10 MG tablet Take 10 mg by mouth.    PIFELTRO 100 mg Tab Take 1 tablet by mouth once daily.    TIVICAY 50 mg Tab Take 1 tablet by mouth once daily. Take with pifeltro     Family History       Problem Relation (Age of Onset)    Cancer Mother    Depression Sister    Gout Son    Heart disease Father    No Known Problems Brother    Vision loss Mother          Tobacco Use    Smoking status: Former    Smokeless tobacco: Never   Substance and Sexual Activity    Alcohol use: Not Currently    Drug use: Not on file    Sexual activity: Not on file     Review of Systems   Constitutional:   Negative for chills, diaphoresis, fatigue and fever.   HENT:  Negative for sinus pressure, sinus pain, sore throat and tinnitus.    Eyes:  Negative for visual disturbance.   Respiratory:  Negative for cough, chest tightness and shortness of breath.    Cardiovascular:  Negative for chest pain, palpitations and leg swelling.   Gastrointestinal:  Positive for abdominal distention and abdominal pain. Negative for anal bleeding, nausea and vomiting.   Genitourinary:  Negative for hematuria.   Musculoskeletal:  Negative for neck pain and neck stiffness.   Skin:  Negative for wound.   Neurological:  Negative for tremors, seizures, syncope and speech difficulty.   Psychiatric/Behavioral:  Negative for agitation, behavioral problems and confusion.      Objective:     Vital Signs (Most Recent):  Temp: 97.8 °F (36.6 °C) (11/03/23 0141)  Pulse: 78 (11/03/23 0305)  Resp: 14 (11/03/23 0305)  BP: (!) 85/51 (11/03/23 0305)  SpO2: 97 % (11/03/23 0305) Vital Signs (24h Range):  Temp:  [97.6 °F (36.4 °C)-97.8 °F (36.6 °C)] 97.8 °F (36.6 °C)  Pulse:  [78-91] 78  Resp:  [10-22] 14  SpO2:  [94 %-100 %] 97 %  BP: ()/(41-68) 85/51     Weight: 77.6 kg (171 lb)  Body mass index is 26.78 kg/m².     Physical Exam  Vitals and nursing note reviewed.   Constitutional:       General: She is not in acute distress.     Appearance: Normal appearance. She is ill-appearing.   HENT:      Head: Normocephalic and atraumatic.      Right Ear: External ear normal.      Left Ear: External ear normal.      Nose: Nose normal.      Mouth/Throat:      Mouth: Mucous membranes are dry.      Pharynx: Oropharynx is clear.   Eyes:      General:         Right eye: No discharge.         Left eye: No discharge.      Conjunctiva/sclera: Conjunctivae normal.   Cardiovascular:      Rate and Rhythm: Normal rate and regular rhythm.      Pulses: Normal pulses.      Heart sounds: Normal heart sounds.   Pulmonary:      Effort: Pulmonary effort is normal.      Breath  sounds: Normal breath sounds.   Abdominal:      General: There is distension.      Tenderness: There is abdominal tenderness. There is no guarding or rebound.      Hernia: A hernia is present.      Comments: Umbilical hernia noted   Musculoskeletal:      Cervical back: Neck supple. No tenderness.      Right lower leg: No edema.      Left lower leg: No edema.   Skin:     General: Skin is warm.   Neurological:      General: No focal deficit present.      Mental Status: She is alert and oriented to person, place, and time.   Psychiatric:         Mood and Affect: Mood normal.         Behavior: Behavior normal.         Thought Content: Thought content normal.         Judgment: Judgment normal.                Significant Labs: All pertinent labs within the past 24 hours have been reviewed.    Significant Imaging: I have reviewed all pertinent imaging results/findings within the past 24 hours.  I have reviewed and interpreted all pertinent imaging results/findings within the past 24 hours.

## 2023-11-03 NOTE — H&P
[facility] - Emergency Department  [facility]  History & Physical    Patient Name: Marian Tabares  MRN: [id number]  Patient Class: IP- Inpatient  Admission Date: 11/2/2023  Attending Physician: Aden Cornejo DO   Primary Care Provider: Darlene, Primary Doctor         Patient information was obtained from patient, past medical records and ER records.     Subjective:     Principal Problem:Peritonitis    Chief Complaint:   Chief Complaint   Patient presents with    Abdominal Pain        HPI: Patient is a 42 yo female who presents to the hospital with a complaint of abdominal pain and hypotension. Patient has a history of ESRD since 2007 due to  HIV and is on home PD every night. She described that pain as a diffuse abdominal pain more pronounced at the periumbilical region that started 14 days ago but worsened over the past few days. Associated symptoms include chills, nausea, vomiting and bloating, but denies fever, chest pain, palpitations, and shortness of breath. She was seen by her PCP who told her that she could have a fungal infection and was thus started on fungal infection without improvement of her symptoms, which culminated in this ER visit today. Of note, she was admitted at [facility] in May 2022 for abdominal pain and hypotension due to recurrent peritonitis, but they were unable to identify the organism and infectious disease recommended empiric antibiotics through vancomycin and Cefepime for three weeks. Prior to that, she was admitted to ICU at [facility] for syncope and septic shock due to MSSA peritonitis. Patient noted that the pain is similar to the one she had during her previous episodes of peritonitis.     At the emergency department, the patient was afebrile and vital signs with /54, Pulse 87, RR 18 and % at room air on arrival and then about four hours later she become hypotensive with BP of 89/51 and tachypneic with RR 22 meeting sepsis while in the emergency department. She was  already started on antibiotics prior to meeting sepsis criteria. Nevertheless, she was treated per sepsis protocol with gentle hydration due to ESRD along with blood collection for culture and lactic acid level. Ensuring work up revealed paracentesis suggestive of peritonitis with cell count excluding RBCs of 3589 and Polys 72. CBC was normal with WBC 8.8, H&H 11.1/36.4 and . CMP was significant for Na 136, hypokalemia with K 2.7, BUN/CR 36/15 with baseline CR of 13.3 and GFR 3. Mg of 1.1. Patient will be admitted for further evaluation and management.      Past Medical History:   Diagnosis Date    Digestive disorder     ESRD on peritoneal dialysis     Human immunodeficiency virus (HIV) disease     Hypothyroidism        Past Surgical History:   Procedure Laterality Date     SECTION      KIDNEY TRANSPLANT  2015    [facility]    THYROIDECTOMY         Review of patient's allergies indicates:   Allergen Reactions    Ceftazidime Itching    Iron sucrose Itching    Sodium ferric gluconat-sucrose Itching    Cefazolin Itching    Gentamicin Itching     Not allergic     Iron Itching    Soap Hives    Sodium ferric gluconate complex Itching    Sucrose Itching    Adhesive Hives and Rash     No surgical tape    Povidone-iodine Itching, Rash and Hives     Burning to skin  blisters         No current facility-administered medications on file prior to encounter.     Current Outpatient Medications on File Prior to Encounter   Medication Sig    aspirin (ECOTRIN) 81 MG EC tablet Take 81 mg by mouth once daily.    EScitalopram oxalate (LEXAPRO) 20 MG tablet Take 20 mg by mouth.    famotidine (PEPCID) 20 MG tablet Take 20 mg by mouth every evening.    fluconazole (DIFLUCAN) 100 MG tablet Take 100 mg by mouth once daily. Take for 7 days    HYDROcodone-acetaminophen (NORCO) 7.5-325 mg per tablet Take 1 tablet by mouth.    levothyroxine (SYNTHROID) 150 MCG tablet Take 150 mcg by mouth.    midodrine (PROAMATINE) 10 MG tablet  Take 10 mg by mouth.    PIFELTRO 100 mg Tab Take 1 tablet by mouth once daily.    TIVICAY 50 mg Tab Take 1 tablet by mouth once daily. Take with pifeltro     Family History       Problem Relation (Age of Onset)    Cancer Mother    Depression Sister    Gout Son    Heart disease Father    No Known Problems Brother    Vision loss Mother          Tobacco Use    Smoking status: Former    Smokeless tobacco: Never   Substance and Sexual Activity    Alcohol use: Not Currently    Drug use: Not on file    Sexual activity: Not on file     Review of Systems   Constitutional:  Negative for chills, diaphoresis, fatigue and fever.   HENT:  Negative for sinus pressure, sinus pain, sore throat and tinnitus.    Eyes:  Negative for visual disturbance.   Respiratory:  Negative for cough, chest tightness and shortness of breath.    Cardiovascular:  Negative for chest pain, palpitations and leg swelling.   Gastrointestinal:  Positive for abdominal distention and abdominal pain. Negative for anal bleeding, nausea and vomiting.   Genitourinary:  Negative for hematuria.   Musculoskeletal:  Negative for neck pain and neck stiffness.   Skin:  Negative for wound.   Neurological:  Negative for tremors, seizures, syncope and speech difficulty.   Psychiatric/Behavioral:  Negative for agitation, behavioral problems and confusion.      Objective:     Vital Signs (Most Recent):  Temp: 97.8 °F (36.6 °C) (11/03/23 0141)  Pulse: 78 (11/03/23 0305)  Resp: 14 (11/03/23 0305)  BP: (!) 85/51 (11/03/23 0305)  SpO2: 97 % (11/03/23 0305) Vital Signs (24h Range):  Temp:  [97.6 °F (36.4 °C)-97.8 °F (36.6 °C)] 97.8 °F (36.6 °C)  Pulse:  [78-91] 78  Resp:  [10-22] 14  SpO2:  [94 %-100 %] 97 %  BP: ()/(41-68) 85/51     Weight: 77.6 kg (171 lb)  Body mass index is 26.78 kg/m².     Physical Exam  Vitals and nursing note reviewed.   Constitutional:       General: She is not in acute distress.     Appearance: Normal appearance. She is ill-appearing.   HENT:       Head: Normocephalic and atraumatic.      Right Ear: External ear normal.      Left Ear: External ear normal.      Nose: Nose normal.      Mouth/Throat:      Mouth: Mucous membranes are dry.      Pharynx: Oropharynx is clear.   Eyes:      General:         Right eye: No discharge.         Left eye: No discharge.      Conjunctiva/sclera: Conjunctivae normal.   Cardiovascular:      Rate and Rhythm: Normal rate and regular rhythm.      Pulses: Normal pulses.      Heart sounds: Normal heart sounds.   Pulmonary:      Effort: Pulmonary effort is normal.      Breath sounds: Normal breath sounds.   Abdominal:      General: There is distension.      Tenderness: There is abdominal tenderness. There is no guarding or rebound.      Hernia: A hernia is present.      Comments: Umbilical hernia noted   Musculoskeletal:      Cervical back: Neck supple. No tenderness.      Right lower leg: No edema.      Left lower leg: No edema.   Skin:     General: Skin is warm.   Neurological:      General: No focal deficit present.      Mental Status: She is alert and oriented to person, place, and time.   Psychiatric:         Mood and Affect: Mood normal.         Behavior: Behavior normal.         Thought Content: Thought content normal.         Judgment: Judgment normal.                Significant Labs: All pertinent labs within the past 24 hours have been reviewed.    Significant Imaging: I have reviewed all pertinent imaging results/findings within the past 24 hours.  I have reviewed and interpreted all pertinent imaging results/findings within the past 24 hours.  Assessment/Plan:     * Severe Sepsis    This patient does have evidence of infective focus  My overall impression is sepsis.  Source: Abdominal  Antibiotics given-   Antibiotics (72h ago, onward)      Start     Stop Route Frequency Ordered    11/05/23 0600  levoFLOXacin tablet 250 mg         -- Oral Every 48 hours (non-standard times) 11/03/23 0121          Latest lactate  "reviewed-  No results for input(s): "LACTATE" in the last 72 hours.  Organ dysfunction indicated by Acute liver injury    Fluid challenge Other- Patient to receive 250 ml volume other than 30cc/kg due to End Stage Renal Disease     Post- resuscitation assessment Yes Perfusion exam was performed within 6 hours of septic shock presentation after bolus shows Adequate tissue perfusion assessed by non-invasive monitoring       Will Not start Pressors- Levophed for MAP of 65  Source control achieved by: antibiotics        Peritonitis  - Continue levaquin  - CBC and renal panel in AM  - Tylenol and pain medication PRN  - Monitor CBC daily    Hypotension  - Continue home midodrine   - Monitor blood pressure closely    ESRD on peritoneal dialysis  - BUN/CR 36/15  - Avoid nephrotoxic drugs  - Renal dose applicable medication  - Strict I and O  - Monitor renal function daily  - Nephrology consulted for dialysis, thanks for the assistance      Human immunodeficiency virus (HIV) disease  Continue home medication while at the hospital  Pending CD4 count    Postoperative hypothyroidism  Patient with history of Grave Disease post thyroidectomy  Pending TSH  Adjust the dose of home levothyroxine as indicated       Depression  Patient has persistent depression which is moderate and is currently controlled. Will Continue anti-depressant medications. We will not consult psychiatry at this time. Patient does not display psychosis at this time. Continue to monitor closely and adjust plan of care as needed.        Electrolyte abnormality  - Mg 1.1 and K 2.7  - Replacing Mg and recheck lab in lab  - Replacing K but patient is ESRD on PD  - Nephrology consulted for dialysis, thanks for the assistance      GERD (gastroesophageal reflux disease)  Continue home pepcid        VTE Risk Mitigation (From admission, onward)           Ordered     Place sequential compression device  Until discontinued         11/03/23 0355                       Moses" MD Mohamud  Department of Hospital Medicine  [facility] - Emergency Department    COMPLETED  Family history non-contributory to current problem   Pertinent information:

## 2023-11-03 NOTE — ASSESSMENT & PLAN NOTE
- Mg 1.1 and K 2.7  - Replacing Mg and recheck lab in lab  - Replacing K but patient is ESRD on PD  - Nephrology consulted for dialysis, thanks for the assistance

## 2023-11-03 NOTE — ASSESSMENT & PLAN NOTE
- BUN/CR 36/15  - Avoid nephrotoxic drugs  - Renal dose applicable medication  - Strict I and O  - Monitor renal function daily  - Nephrology consulted for dialysis, thanks for the assistance

## 2023-11-03 NOTE — ED PROVIDER NOTES
Encounter Date: 2023    SCRIBE #1 NOTE: I, Haleigh Cartagena, am scribing for, and in the presence of,  Louie Johnson MD. I have scribed the entire note.       History     Chief Complaint   Patient presents with    Abdominal Pain     This is a 42 y/o female,who presents to the ED with complaints of abdominal pain. She states she is a peritoneal dialysis pt and was last dialyzed last night. She states she was seen in clinic and was told she could have a fungal infection in her abdomen for the past 14 days. There is no Hx of a fever, nausea, vomiting, diarrhea or hematuria. She states she also has a umbilical hernia as well. There are no other complaints/pain in the ED at this time. Pt has a known hx of HIV and renal disorder. There is no smoking hx.     The history is provided by the patient. No  was used.     Review of patient's allergies indicates:   Allergen Reactions    Ceftazidime Itching    Iron sucrose Itching    Sodium ferric gluconat-sucrose Itching    Cefazolin Itching    Gentamicin Itching     Not allergic     Iron Itching    Soap Hives    Sodium ferric gluconate complex Itching    Sucrose Itching    Adhesive Hives and Rash     No surgical tape    Povidone-iodine Itching, Rash and Hives     Burning to skin  blisters       Past Medical History:   Diagnosis Date    Digestive disorder     ESRD on peritoneal dialysis     Human immunodeficiency virus (HIV) disease     Hypothyroidism      Past Surgical History:   Procedure Laterality Date     SECTION      INSERTION, CATHETER, TUNNELED N/A 2023    Procedure: INSERTION, CATHETER, TUNNELED;  Surgeon: Jules Álvarez DO;  Location: [facility] OR;  Service: General;  Laterality: N/A;    KIDNEY TRANSPLANT  2015    [facility]    PERITONEAL CATHETER REMOVAL N/A 2023    Procedure: REMOVAL, CATHETER, DIALYSIS, PERITONEAL;  Surgeon: Jules Álvarez DO;  Location: [facility] OR;  Service: General;  Laterality: N/A;     THYROIDECTOMY       Family History   Problem Relation Age of Onset    Cancer Mother     Vision loss Mother     Heart disease Father     Depression Sister     No Known Problems Brother     Gout Son      Social History     Tobacco Use    Smoking status: Former    Smokeless tobacco: Never   Substance Use Topics    Alcohol use: Not Currently     Review of Systems   Constitutional:  Negative for fever.   Gastrointestinal:  Positive for abdominal pain. Negative for diarrhea, nausea and vomiting.        Umbilicus hernia.    Genitourinary:  Negative for hematuria.   All other systems reviewed and are negative.      Physical Exam     Initial Vitals [11/02/23 2015]   BP Pulse Resp Temp SpO2   (!) 101/54 87 18 97.6 °F (36.4 °C) 100 %      MAP       --         Physical Exam    Nursing note and vitals reviewed.  Constitutional: She appears well-developed and well-nourished.   HENT:   Head: Normocephalic and atraumatic.   Eyes: Conjunctivae and EOM are normal. Pupils are equal, round, and reactive to light.   Neck: Neck supple.   Normal range of motion.  Cardiovascular:  Normal rate, regular rhythm, normal heart sounds and intact distal pulses.           Pulmonary/Chest: Breath sounds normal.   Abdominal: Abdomen is soft. Bowel sounds are normal.   There is an umbilical hernia noted.      Musculoskeletal:         General: Normal range of motion.      Cervical back: Normal range of motion and neck supple.     Neurological: She is alert and oriented to person, place, and time. She has normal strength.   Skin: Skin is warm and dry. Capillary refill takes less than 2 seconds.   Psychiatric: She has a normal mood and affect. Thought content normal.         ED Course   Procedures  Labs Reviewed   COMPREHENSIVE METABOLIC PANEL - Abnormal; Notable for the following components:       Result Value    Potassium 2.7 (*)     Chloride 96 (*)     BUN 36 (*)     Creatinine 15.00 (*)     BUN/Creatinine Ratio 2 (*)     Calcium 7.7 (*)     Albumin  2.2 (*)     Globulin 5.9 (*)     ALT 10 (*)     AST 8 (*)     eGFR 3 (*)     All other components within normal limits   CBC WITH DIFFERENTIAL - Abnormal; Notable for the following components:    Hemoglobin 11.1 (*)     Hematocrit 35.4 (*)     MCH 26.4 (*)     MCHC 31.4 (*)     RDW 16.6 (*)     Neutrophils % 83.2 (*)     Lymphocytes % 8.1 (*)     Immature Granulocytes % 0.5 (*)     Lymphocytes, Absolute 0.72 (*)     All other components within normal limits   CELL COUNT, BODY FLUID - Abnormal; Notable for the following components:    Clarity, Body Fluid Hazy (*)     Cell Count Excluding RBCs 3,589 (*)     All other components within normal limits   DIFFERENTIAL, BODY FLUID - Abnormal; Notable for the following components:    Polys, Fluid Man % 72 (*)     All other components within normal limits   MAGNESIUM - Abnormal; Notable for the following components:    Magnesium 1.1 (*)     All other components within normal limits   POTASSIUM - Abnormal; Notable for the following components:    Potassium 2.8 (*)     All other components within normal limits   MAGNESIUM - Abnormal; Notable for the following components:    Magnesium 1.3 (*)     All other components within normal limits   TSH - Abnormal; Notable for the following components:    .000 (*)     All other components within normal limits   LIPASE - Normal   SARS-COV-2 RNA AMPLIFICATION, QUAL - Normal    Narrative:     Negative SARS-CoV results should not be used as the sole basis for treatment or patient management decisions; negative results should be considered in the context of a patient's recent exposures, history and the presene of clinical signs and symptoms consistent with COVID-19.  Negative results should be treated as presumptive and confirmed by molecular assay, if necessary for patient management.   PT AND PTT - Normal   LACTIC ACID, PLASMA - Normal   CBC W/ AUTO DIFFERENTIAL    Narrative:     The following orders were created for panel order CBC  auto differential.  Procedure                               Abnormality         Status                     ---------                               -----------         ------                     CBC with Differential[[id number]]       Abnormal            Final result                 Please view results for these tests on the individual orders.   CELL COUNT W/ DIFF, BODY FLUID    Narrative:     The following orders were created for panel order Cell Count w/ Diff, Fluid.  Procedure                               Abnormality         Status                     ---------                               -----------         ------                     Cell Count, Body Fluid[[id number]]      Abnormal            Final result               Differential, Body Fluid[[id number]]    Abnormal            Final result                 Please view results for these tests on the individual orders.          Imaging Results              X-Ray Chest AP Portable (Final result)  Result time 11/03/23 07:53:34      Final result by Joe Mason DO (11/03/23 07:53:34)                   Impression:      Mild bibasilar atelectasis/infiltrate.    Point of Service: [facility]      Electronically signed by: Joe Mason  Date:    11/03/2023  Time:    07:53               Narrative:    EXAMINATION:  XR CHEST AP PORTABLE    CLINICAL HISTORY:  Sepsis;    COMPARISON:  Chest x-ray January 19, 2019    TECHNIQUE:  Frontal view/views of the chest.    FINDINGS:  Mild bibasilar atelectasis/infiltrate.  Heart size appears within normal limits.  Visualized osseous and surrounding soft tissue structures appear grossly unchanged.  Surgical clips within the neck base.                                       Medications   magnesium sulfate in dextrose IVPB (premix) 1 g (0 g Intravenous Stopped 11/3/23 0202)   levoFLOXacin tablet 500 mg (500 mg Oral Given 11/3/23 0138)   sodium chloride 0.9% bolus 250 mL 250 mL (0 mLs Intravenous Stopped 11/3/23 4053)    sodium chloride 0.9% bolus 250 mL 250 mL (0 mLs Intravenous Stopped 11/3/23 0633)   vancomycin (VANCOCIN) 1,500 mg in dextrose 5 % (D5W) 250 mL IVPB (0 mg Intravenous Stopped 11/3/23 1130)   potassium chloride SA CR tablet 40 mEq (40 mEq Oral Given 11/3/23 1129)   magnesium sulfate 2g in water 50mL IVPB (premix) (0 g Intravenous Stopped 11/3/23 1500)   alteplase injection 2 mg (2 mg Intra-Catheter Given 11/6/23 1145)   alteplase injection 2 mg (2 mg Intra-Catheter Given 11/6/23 1145)     Medical Decision Making  42 Y/O hiv PATIENT ON PERTONEAL DIALYSIS WITH ABDOMINAL PAIN.    DDX:  PERITONITIS, FUNGAL VS BACTERIAL....  VS OTHER ABDOMINAL ISSUE VS OTHER.    ADMIT FOR IV ANTIBIOTICS AND SEPSIS.      Amount and/or Complexity of Data Reviewed  Labs: ordered.  Discussion of management or test interpretation with external provider(s): 0040: Dr. Johnson contacts Dr. Osborne, the on call hospitalist, to discuss the pt's case and possible admissions. Dr. Osborne accepts the pt at this time.     Risk  Prescription drug management.  Decision regarding hospitalization.              Attending Attestation:           Physician Attestation for Scribe:  Physician Attestation Statement for Scribe #1: I, Louie Johnson MD, reviewed documentation, as scribed by Haleigh Cartagena in my presence, and it is both accurate and complete.                             Clinical Impression:   Final diagnoses:  [K65.9] Peritonitis (Primary)  [A41.9, R65.20] Severe sepsis [A41.9, R65.20]  [F34.1] Persistent depressive disorder [F34.1]  [N18.6, Z99.2] ESRD on peritoneal dialysis [N18.6, Z99.2]  [E89.0] Postoperative hypothyroidism [E89.0]  [K21.9] Gastroesophageal reflux disease, unspecified whether esophagitis present [K21.9]        ED Disposition Condition    Admit Stable                Louie Johnson MD  11/21/23 6757

## 2023-11-03 NOTE — ASSESSMENT & PLAN NOTE
Patient with history of Grave Disease post thyroidectomy  Pending TSH but continue home levothyroxine  Adjust the dose as indicated

## 2023-11-03 NOTE — ASSESSMENT & PLAN NOTE
11/4: Plan for PD cath removal in OR tomorrow.  Patient currently treated with IV antibiotics managed by primary. Risk benefits of surgical interventions explained in detail. Pateint agrees to proceed.

## 2023-11-03 NOTE — ASSESSMENT & PLAN NOTE
- Continue levaquin  - CBC and renal panel in AM  - Tylenol and pain medication PRN  - Monitor CBC daily

## 2023-11-03 NOTE — PLAN OF CARE
Problem: Adult Inpatient Plan of Care  Goal: Plan of Care Review  Outcome: Ongoing, Progressing  Goal: Patient-Specific Goal (Individualized)  Outcome: Ongoing, Progressing  Goal: Absence of Hospital-Acquired Illness or Injury  Outcome: Ongoing, Progressing  Goal: Optimal Comfort and Wellbeing  Outcome: Ongoing, Progressing  Goal: Readiness for Transition of Care  Outcome: Ongoing, Progressing     Problem: Adjustment to Illness (Sepsis/Septic Shock)  Goal: Optimal Coping  Outcome: Ongoing, Progressing     Problem: Bleeding (Sepsis/Septic Shock)  Goal: Absence of Bleeding  Outcome: Ongoing, Progressing     Problem: Glycemic Control Impaired (Sepsis/Septic Shock)  Goal: Blood Glucose Level Within Desired Range  Outcome: Ongoing, Progressing     Problem: Infection Progression (Sepsis/Septic Shock)  Goal: Absence of Infection Signs and Symptoms  Outcome: Ongoing, Progressing     Problem: Nutrition Impaired (Sepsis/Septic Shock)  Goal: Optimal Nutrition Intake  Outcome: Ongoing, Progressing     Problem: Fall Injury Risk  Goal: Absence of Fall and Fall-Related Injury  Outcome: Ongoing, Progressing     Problem: Pain Acute  Goal: Acceptable Pain Control and Functional Ability  Outcome: Ongoing, Progressing     Problem: Fatigue  Goal: Improved Activity Tolerance  Outcome: Ongoing, Progressing     Problem: Infection  Goal: Absence of Infection Signs and Symptoms  Outcome: Ongoing, Progressing     Problem: Activity Intolerance  Goal: Enhanced Capacity and Energy  Outcome: Ongoing, Progressing      normal...

## 2023-11-03 NOTE — CONSULTS
Pharmacokinetic Initial Assessment: IV Vancomycin    Assessment/Plan:    Initiate intravenous vancomycin with loading dose of 1500 mg once with subsequent doses when random concentrations are less than 20 mcg/mL  Desired empiric serum trough concentration is 10 to 20 mcg/mL  Draw vancomycin random level on 11/4 at 0600.  Pharmacy will continue to follow and monitor vancomycin.      Please contact pharmacy at extension [phone] with any questions regarding this assessment.     Thank you for the consult,   Berenice Artesian       Patient brief summary:  Marian Tabares is a 43 y.o. female initiated on antimicrobial therapy with IV Vancomycin for treatment of suspected intra-abdominal infection    Drug Allergies:   Review of patient's allergies indicates:   Allergen Reactions    Ceftazidime Itching    Iron sucrose Itching    Sodium ferric gluconat-sucrose Itching    Cefazolin Itching    Gentamicin Itching     Not allergic     Iron Itching    Soap Hives    Sodium ferric gluconate complex Itching    Sucrose Itching    Adhesive Hives and Rash     No surgical tape    Povidone-iodine Itching, Rash and Hives     Burning to skin  blisters         Actual Body Weight:   77.6kg    Renal Function:   Estimated Creatinine Clearance: 5.1 mL/min (A) (based on SCr of 15 mg/dL (H)).,     Dialysis Method (if applicable):  peritoneal dialysis    CBC (last 72 hours):  Recent Labs   Lab Result Units 11/02/23 2038   WBC K/uL 8.87   Hemoglobin g/dL 11.1*   Hematocrit % 35.4*   Platelet Count K/uL 204   Lymphocytes % % 8.1*   Monocytes % % 6.0   Eosinophils % % 2.0   Basophils % % 0.2   Diff Type  Auto       Metabolic Panel (last 72 hours):  Recent Labs   Lab Result Units 11/02/23 2038   Sodium mmol/L 136   Potassium mmol/L 2.7*   Chloride mmol/L 96*   CO2 mmol/L 31   Glucose mg/dL 90   BUN mg/dL 36*   Creatinine mg/dL 15.00*   Albumin g/dL 2.2*   Bilirubin, Total mg/dL 0.3   Alk Phos U/L 58   AST U/L 8*   ALT U/L 10*   Magnesium mg/dL 1.1*  "      Drug levels (last 3 results):  No results for input(s): "VANCOMYCINRA", "VANCORANDOM", "VANCOMYCINPE", "VANCOPEAK", "VANCOMYCINTR", "VANCOTROUGH" in the last 72 hours.    Microbiologic Results:  Microbiology Results (last 7 days)       Procedure Component Value Units Date/Time    Blood culture (site 2) [[id number]] Collected: 11/03/23 0538    Order Status: Sent Specimen: Blood from Peripheral, Forearm, Right Updated: 11/03/23 0544    Blood culture (site 1) [[id number]] Collected: 11/03/23 0538    Order Status: Sent Specimen: Blood from Peripheral, Forearm, Left Updated: 11/03/23 0544    Blood culture (site 1) [[id number]]     Order Status: Canceled Specimen: Blood     Blood culture (site 2) [[id number]]     Order Status: Canceled Specimen: Blood     Culture, Body Fluid [[id number]] Collected: 11/02/23 2154    Order Status: Resulted Specimen: Body Fluid from Peritoneal Updated: 11/02/23 2159            "

## 2023-11-03 NOTE — HPI
43-year-old female with a past medical history of HIV, hypothyroidism and end-stage renal disease who was seen in consult for infected PD catheter.  Patient is followed by Dr. Lemos, nephrologist and was seen in his clinic last week.  Peritoneal fluid showed fungal peritonitis.  Patient was admitted last night from the ED after presenting with abdominal pain and hypotension.  Plan for PD cath removal in OR tomorrow.  Patient currently treated with IV antibiotics managed by primary.

## 2023-11-03 NOTE — ASSESSMENT & PLAN NOTE
"This patient does have evidence of infective focus  My overall impression is sepsis.  Source: Abdominal  Antibiotics given-   Antibiotics (72h ago, onward)    Start     Stop Route Frequency Ordered    11/05/23 0600  levoFLOXacin tablet 250 mg         -- Oral Every 48 hours (non-standard times) 11/03/23 0121        Latest lactate reviewed-  No results for input(s): "LACTATE" in the last 72 hours.  Organ dysfunction indicated by Acute liver injury    Fluid challenge Other- Patient to receive 250 ml volume other than 30cc/kg due to End Stage Renal Disease     Post- resuscitation assessment Yes Perfusion exam was performed within 6 hours of septic shock presentation after bolus shows Adequate tissue perfusion assessed by non-invasive monitoring       Will Not start Pressors- Levophed for MAP of 65  Source control achieved by: antibiotics  "

## 2023-11-03 NOTE — CARE UPDATE
Confirmed fungal peritonitis per Dr. Lemos- fluid cultured last week in Nephrology clinic. Start fluconazole. Gen Surg for HD cath. Will stop abx for now. Repeat fluid cultures sent in ED last night. Overall patient looks good.

## 2023-11-03 NOTE — HPI
Patient is a 42 yo female who presents to the hospital with a complaint of abdominal pain and hypotension. Patient has a history of ESRD since 2007 due to  HIV and is on home PD every night. She described that pain as a diffuse abdominal pain more pronounced at the periumbilical region that started 14 days ago but worsened over the past few days. Associated symptoms include chills, nausea, vomiting and bloating, but denies fever, chest pain, palpitations, and shortness of breath. She was seen by her PCP who told her that she could have a fungal infection and was thus started on fungal infection without improvement of her symptoms, which culminated in this ER visit today. Of note, she was admitted at [facility] in May 2022 for abdominal pain and hypotension due to recurrent peritonitis, but they were unable to identify the organism and infectious disease recommended empiric antibiotics through vancomycin and Cefepime for three weeks. Prior to that, she was admitted to ICU at [facility] for syncope and septic shock due to MSSA peritonitis. Patient noted that the pain is similar to the one she had during her previous episodes of peritonitis.     At the emergency department, the patient was afebrile and vital signs with /54, Pulse 87, RR 18 and % at room air on arrival and then about four hours later she become hypotensive with BP of 89/51 and tachypneic with RR 22 meeting sepsis while in the emergency department. She was already started on antibiotics prior to meeting sepsis criteria. Nevertheless, she was treated per sepsis protocol with gentle hydration due to ESRD along with blood collection for culture and lactic acid level. Ensuring work up revealed paracentesis suggestive of peritonitis with cell count excluding RBCs of 3589 and Polys 72. CBC was normal with WBC 8.8, H&H 11.1/36.4 and . CMP was significant for Na 136, hypokalemia with K 2.7, BUN/CR 36/15 with baseline CR of 13.3 and GFR 3. Mg of 1.1.  Patient will be admitted for further evaluation and management.

## 2023-11-03 NOTE — ANESTHESIA PREPROCEDURE EVALUATION
"                                                                                                             11/03/2023  Marian Tabares is a 43 y.o., female.      Pre-op Assessment    I have reviewed the Patient Summary Reports.    I have reviewed the NPO Status.   I have reviewed the Medications.     Review of Systems         Anesthesia Plan  Type of Anesthesia, risks & benefits discussed:    Anesthesia Type: Gen Supraglottic Airway  Intra-op Monitoring Plan: Standard ASA Monitors  Post Op Pain Control Plan: IV/PO Opioids PRN  Induction:  IV  Informed Consent: Informed consent signed with the Patient and all parties understand the risks and agree with anesthesia plan.  All questions answered.   ASA Score: 3    Ready For Surgery From Anesthesia Perspective.     .  No known anesthetic complications  Allergies   Ceftazidime Itching High 7/31/2017   Iron Sucrose Itching High 7/31/2017   Sodium Ferric Gluconat-sucrose Itching High 5/2/2022   Cefazolin Itching Not Specified 7/31/2017   Gentamicin Itching Not Specified 4/3/2018   Not allergic   Iron Itching Not Specified 2/22/2018   Soap Hives Not Specified 7/31/2017   Sodium Ferric Gluconate Complex Itching Not Specified 7/31/2017   Sucrose Itching Not Specified 7/31/2017   Adhesive Hives, Rash Low 4/3/2018   No surgical tape   Povidone-iodine    V/S:   HR 70-73  BP 77/46-88/47  Sats  %    Hct 35  K 2.8  Cr 15  Ca 7.7  11/3/23 CXR: "Mild bibasilar atelectasis/infiltrate."    Medical History   ESRD on peritoneal dialysis Human immunodeficiency virus (HIV) disease   Hypothyroidism    H/o kidney transplant  GERD  Depression  Sepsis  Hypotension  Hypokalemia  Hypocalcemia    Airway exam deferred (COVID precautions)  "

## 2023-11-03 NOTE — H&P (VIEW-ONLY)
[facility]  General Surgery  Consult Note    Patient Name: Marian Tabares  MRN: [id number]  Code Status: Full Code  Admission Date: 11/2/2023  Hospital Length of Stay: 0 days  Attending Physician: Aden Cornejo DO  Primary Care Provider: Darlene Primary Doctor    Patient information was obtained from patient, past medical records and ER records.     Inpatient consult to General Surgery  Consult performed by: Wilmer Harris ACNP  Consult ordered by: Zachary Lemos MD  Reason for consult: placement of tunneled HD cath and removal of perotineal cath      Subjective:     Principal Problem: Severe sepsis    History of Present Illness: 43-year-old female with a past medical history of HIV, hypothyroidism and end-stage renal disease who was seen in consult for infected PD catheter.  Patient is followed by Dr. Lemos, nephrologist and was seen in his clinic last week.  Peritoneal fluid showed fungal peritonitis.  Patient was admitted last night from the ED after presenting with abdominal pain and hypotension.  Plan for PD cath removal in OR tomorrow.  Patient currently treated with IV antibiotics managed by primary.          No current facility-administered medications on file prior to encounter.     Current Outpatient Medications on File Prior to Encounter   Medication Sig    aspirin (ECOTRIN) 81 MG EC tablet Take 81 mg by mouth once daily.    EScitalopram oxalate (LEXAPRO) 20 MG tablet Take 20 mg by mouth.    famotidine (PEPCID) 20 MG tablet Take 20 mg by mouth every evening.    fluconazole (DIFLUCAN) 100 MG tablet Take 100 mg by mouth once daily. Take for 7 days    HYDROcodone-acetaminophen (NORCO) 7.5-325 mg per tablet Take 1 tablet by mouth.    levothyroxine (SYNTHROID) 150 MCG tablet Take 150 mcg by mouth.    midodrine (PROAMATINE) 10 MG tablet Take 10 mg by mouth.    PIFELTRO 100 mg Tab Take 1 tablet by mouth once daily.    TIVICAY 50 mg Tab Take 1 tablet by mouth once daily. Take with  pifeltro       Review of patient's allergies indicates:   Allergen Reactions    Ceftazidime Itching    Iron sucrose Itching    Sodium ferric gluconat-sucrose Itching    Cefazolin Itching    Gentamicin Itching     Not allergic     Iron Itching    Soap Hives    Sodium ferric gluconate complex Itching    Sucrose Itching    Adhesive Hives and Rash     No surgical tape    Povidone-iodine Itching, Rash and Hives     Burning to skin  blisters         Past Medical History:   Diagnosis Date    Digestive disorder     ESRD on peritoneal dialysis     Human immunodeficiency virus (HIV) disease     Hypothyroidism      Past Surgical History:   Procedure Laterality Date     SECTION      KIDNEY TRANSPLANT  2015    [facility]    THYROIDECTOMY       Family History       Problem Relation (Age of Onset)    Cancer Mother    Depression Sister    Gout Son    Heart disease Father    No Known Problems Brother    Vision loss Mother          Tobacco Use    Smoking status: Former    Smokeless tobacco: Never   Substance and Sexual Activity    Alcohol use: Not Currently    Drug use: Not on file    Sexual activity: Not on file     Review of Systems   Respiratory:  Negative for chest tightness and shortness of breath.    Cardiovascular:  Negative for chest pain.   Gastrointestinal:  Positive for abdominal distention and abdominal pain. Negative for nausea and vomiting.   All other systems reviewed and are negative.    Objective:     Vital Signs (Most Recent):  Temp: 97.9 °F (36.6 °C) (23 1332)  Pulse: 70 (23 1332)  Resp: 18 (23 1332)  BP: (!) 78/49 (23 1332)  SpO2: 98 % (23 1332) Vital Signs (24h Range):  Temp:  [97.6 °F (36.4 °C)-97.9 °F (36.6 °C)] 97.9 °F (36.6 °C)  Pulse:  [70-91] 70  Resp:  [9-26] 18  SpO2:  [93 %-100 %] 98 %  BP: ()/(41-68) 78/49     Weight: 77.6 kg (171 lb)  Body mass index is 26.78 kg/m².     Physical Exam  Vitals reviewed.   HENT:      Head: Normocephalic and atraumatic.   Eyes:       Extraocular Movements: Extraocular movements intact.   Cardiovascular:      Rate and Rhythm: Normal rate.      Pulses: Normal pulses.   Pulmonary:      Effort: Pulmonary effort is normal.   Abdominal:      General: There is distension.      Tenderness: There is abdominal tenderness.   Musculoskeletal:         General: No swelling. Normal range of motion.   Skin:     General: Skin is warm and dry.      Capillary Refill: Capillary refill takes less than 2 seconds.   Neurological:      General: No focal deficit present.      Mental Status: She is alert and oriented to person, place, and time.   Psychiatric:         Mood and Affect: Mood normal.         Behavior: Behavior normal.            I have reviewed all pertinent lab results within the past 24 hours.    Significant Diagnostics:  I have reviewed all pertinent imaging results/findings within the past 24 hours.      Assessment/Plan:     Peritonitis  11/4: Plan for PD cath removal and placement of tunneled HD cath in OR tomorrow.  Patient currently treated with IV antibiotics managed by primary. Risk benefits of surgical interventions explained in detail. Pateint agrees to proceed.         VTE Risk Mitigation (From admission, onward)           Ordered     Place sequential compression device  Until discontinued         11/03/23 7790                    Thank you for your consult. I will follow-up with patient. Please contact us if you have any additional questions.    Wilmer Harris, AMRIKP  General Surgery  [facility]

## 2023-11-03 NOTE — ED TRIAGE NOTES
Pt is on peritoneal dialysis and has had a fungal infection in her abd for 14 days. Pt is in severe abd pain.

## 2023-11-03 NOTE — PLAN OF CARE
[facility]  Initial Discharge Assessment       Primary Care Provider: Darlene, Primary Doctor    Admission Diagnosis: Peritonitis [K65.9]    Admission Date: 11/2/2023  Expected Discharge Date:     Transition of Care Barriers: None    Payor: [facility] MANAGED [employer] / Plan: [facility] DUAL COMPLETE PPO SNP / Product Type: Medicare Advantage /     Extended Emergency Contact Information  Primary Emergency Contact: no, contact  Home Phone: [phone]  Relation: Other    Discharge Plan A: Home  Discharge Plan B: Home       [facility] [place] [address]  [address] [facility] [place] [address]  Phone: [phone] Fax: [phone]    [facility] [place] - [address]  [address]  [place] [address]  Phone: [phone] Fax: [phone]      Initial Assessment (most recent)       Adult Discharge Assessment - 11/03/23 1031          Discharge Assessment    Assessment Type Discharge Planning Assessment     Source of Information patient     Communicated AYUSH with patient/caregiver Date not available/Unable to determine     People in Home child(ingrid), adult     Do you expect to return to your current living situation? Yes     Do you have help at home or someone to help you manage your care at home? No     Prior to hospitilization cognitive status: Unable to Assess     Current cognitive status: Alert/Oriented     Walking or Climbing Stairs --   none    Dressing/Bathing --   none    Home Accessibility stairs to enter home     Home Layout Able to live on 1st floor     Equipment Currently Used at Home --   peritoneal dialysis equipment    Patient currently being followed by outpatient case management? No     Do you currently have service(s) that help you manage your care at home? No     Do you take prescription medications? Yes     Do you have prescription coverage? Yes     Coverage [facility] medicare     Do you have any problems affording any  of your prescribed medications? No     Is the patient taking medications as prescribed? yes     Who is going to help you get home at discharge? son     How do you get to doctors appointments? car, drives self     Are you on dialysis? Yes     Dialysis Name and Scheduled days pt does home peritoneal dialysis daily     Do you take coumadin? No     DME Needed Upon Discharge  none     Discharge Plan discussed with: Patient     Transition of Care Barriers None     Discharge Plan A Home     Discharge Plan B Home        Physical Activity    On average, how many days per week do you engage in moderate to strenuous exercise (like a brisk walk)? 0 days     On average, how many minutes do you engage in exercise at this level? 0 min        Financial Resource Strain    How hard is it for you to pay for the very basics like food, housing, medical care, and heating? Not very hard        Housing Stability    In the last 12 months, was there a time when you were not able to pay the mortgage or rent on time? No     In the last 12 months, how many places have you lived? 1     In the last 12 months, was there a time when you did not have a steady place to sleep or slept in a shelter (including now)? No        Transportation Needs    In the past 12 months, has lack of transportation kept you from medical appointments or from getting medications? No     In the past 12 months, has lack of transportation kept you from meetings, work, or from getting things needed for daily living? No        Food Insecurity    Within the past 12 months, you worried that your food would run out before you got the money to buy more. Never true     Within the past 12 months, the food you bought just didn't last and you didn't have money to get more. Never true        Stress    Do you feel stress - tense, restless, nervous, or anxious, or unable to sleep at night because your mind is troubled all the time - these days? Not at all        Social Connections    In  a typical week, how many times do you talk on the phone with family, friends, or neighbors? More than three times a week     How often do you get together with friends or relatives? More than three times a week     How often do you attend Advent or Zoroastrianism services? Never     Do you belong to any clubs or organizations such as Advent groups, unions, fraternal or athletic groups, or school groups? No     How often do you attend meetings of the clubs or organizations you belong to? Never     Are you , , , , never , or living with a partner? --   single       Alcohol Use    Q1: How often do you have a drink containing alcohol? Never     Q2: How many drinks containing alcohol do you have on a typical day when you are drinking? Patient does not drink     Q3: How often do you have six or more drinks on one occasion? Never                              [facility]  Discharge Assessment    Primary Care Provider: No, Primary Doctor             SS spoke with pt in the ER. Pt lives at home with her son. Pt is on peritoneal dialysis at home. Not current with home health. No dme pta. Discharge plan is to return home when ready with no anticipated needs at this time. SDOH completed. SS will follow as needed.

## 2023-11-03 NOTE — CONSULTS
[facility] - Emergency Department  Nephrology  Consult Note    Patient Name: Marian Tabares  MRN: [id number]  Admission Date: 2023  Hospital Length of Stay: 0 days  Attending Provider: Aden Cornejo DO   Primary Care Physician: Darlene, Primary Doctor  Principal Problem:Severe sepsis    Consults  Subjective:     HPI: Ms Tabares is known to us with her ESRD and now fungal peritonitis on PD. Fluid grew a fungus one week ago in clinic. She began to hurt much worse last pm and came to the ER for that. She is HIV positive and compliant with therapy. Her last peritoneal cath removal and hemo access was last year at [facility]. She says they no longer accept her insurance.    Past Medical History:   Diagnosis Date    Digestive disorder     ESRD on peritoneal dialysis     Human immunodeficiency virus (HIV) disease     Hypothyroidism        Past Surgical History:   Procedure Laterality Date     SECTION      KIDNEY TRANSPLANT  2015    [facility]    THYROIDECTOMY         Review of patient's allergies indicates:   Allergen Reactions    Ceftazidime Itching    Iron sucrose Itching    Sodium ferric gluconat-sucrose Itching    Cefazolin Itching    Gentamicin Itching     Not allergic     Iron Itching    Soap Hives    Sodium ferric gluconate complex Itching    Sucrose Itching    Adhesive Hives and Rash     No surgical tape    Povidone-iodine Itching, Rash and Hives     Burning to skin  blisters       Current Facility-Administered Medications   Medication Frequency    acetaminophen tablet 1,000 mg Q6H PRN    aztreonam (AZACTAM) 500 mg in dextrose 5 % (D5W) 100 mL IVPB Q8H    bisacodyL EC tablet 10 mg Daily PRN    dextromethorphan-guaiFENesin  mg/5 ml liquid 10 mL Q6H PRN    dolutegravir Tab 50 mg Daily    doravirine Tab 100 mg Daily    EScitalopram oxalate tablet 20 mg Daily    famotidine tablet 20 mg QHS    levothyroxine tablet 150 mcg Before breakfast    magnesium sulfate 2g in water 50mL IVPB (premix) Once     metronidazole IVPB 500 mg Q8H    midodrine tablet 10 mg Q8H    morphine injection 4 mg Q4H PRN    ondansetron injection 8 mg Q6H PRN    oxyCODONE immediate release tablet 10 mg Q4H PRN    simethicone chewable tablet 80 mg TID PRN    traZODone tablet 50 mg Nightly PRN    vancomycin - pharmacy to dose pharmacy to manage frequency     Current Outpatient Medications   Medication    aspirin (ECOTRIN) 81 MG EC tablet    EScitalopram oxalate (LEXAPRO) 20 MG tablet    famotidine (PEPCID) 20 MG tablet    fluconazole (DIFLUCAN) 100 MG tablet    HYDROcodone-acetaminophen (NORCO) 7.5-325 mg per tablet    levothyroxine (SYNTHROID) 150 MCG tablet    midodrine (PROAMATINE) 10 MG tablet    PIFELTRO 100 mg Tab    TIVICAY 50 mg Tab     Family History       Problem Relation (Age of Onset)    Cancer Mother    Depression Sister    Gout Son    Heart disease Father    No Known Problems Brother    Vision loss Mother          Tobacco Use    Smoking status: Former    Smokeless tobacco: Never   Substance and Sexual Activity    Alcohol use: Not Currently    Drug use: Not on file    Sexual activity: Not on file     Review of Systems  Objective:     Vital Signs (Most Recent):  Temp: 97.8 °F (36.6 °C) (11/03/23 0141)  Pulse: 76 (11/03/23 0715)  Resp: 12 (11/03/23 0715)  BP: (!) 82/53 (11/03/23 0715)  SpO2: 98 % (11/03/23 0715) Vital Signs (24h Range):  Temp:  [97.6 °F (36.4 °C)-97.8 °F (36.6 °C)] 97.8 °F (36.6 °C)  Pulse:  [74-91] 76  Resp:  [10-22] 12  SpO2:  [94 %-100 %] 98 %  BP: ()/(41-68) 82/53     Weight: 77.6 kg (171 lb) (11/02/23 2018)  Body mass index is 26.78 kg/m².  Body surface area is 1.92 meters squared.    I/O last 3 completed shifts:  In: 600 [I.V.:100; IV Piggyback:500]  Out: -     Physical Exam BP 90 systolic which is usual for her. Chest clear. Abd with positive rebound tenderness. No edema.    Significant Labs:  BMP:   Recent Labs   Lab 11/02/23 2038 11/03/23  0508   GLU 90  --      --    K 2.7* 2.8*   CL 96*  --     CO2 31  --    BUN 36*  --    CREATININE 15.00*  --    CALCIUM 7.7*  --    MG 1.1* 1.3*     CBC:   Recent Labs   Lab 11/02/23 2038   WBC 8.87   RBC 4.21   HGB 11.1*   HCT 35.4*      MCV 84.1   MCH 26.4*   MCHC 31.4*     All labs within the past 24 hours have been reviewed.    Significant Imaging:  Labs: Reviewed    Assessment/Plan:     Active Diagnoses:    Diagnosis Date Noted POA    PRINCIPAL PROBLEM:  Severe sepsis [A41.9, R65.20] 11/03/2023 Yes    Peritonitis [K65.9] 11/03/2023 Yes    GERD (gastroesophageal reflux disease) [K21.9] 11/03/2023 Yes    Postoperative hypothyroidism [E89.0] 11/03/2023 Yes    Electrolyte abnormality [E87.8] 11/03/2023 Yes    Depression [F32.A] 11/03/2023 Yes    Hypotension [I95.9] 11/03/2023 Yes    Human immunodeficiency virus (HIV) disease [B20]  Yes    ESRD on peritoneal dialysis [N18.6, Z99.2]  Not Applicable      Problems Resolved During this Admission:       With fungal peritonitis, she'll need her current PD catheter removed and tunnelled hemo cath placed. I've talked with Dr. Álvarez about that and appreciate his help.  We'll give Diflucan empirically    Thank you for your consult. I will follow-up with patient. Please contact us if you have any additional questions.    Zachary Lemos MD  Nephrology  [facility] - Emergency Department

## 2023-11-04 ENCOUNTER — ANESTHESIA (OUTPATIENT)
Dept: SURGERY | Facility: HOSPITAL | Age: 43
DRG: 974 | End: 2023-11-04
Payer: MEDICARE

## 2023-11-04 LAB
ALBUMIN SERPL BCP-MCNC: 1.7 G/DL (ref 3.5–5)
ANION GAP SERPL CALCULATED.3IONS-SCNC: 10 MMOL/L (ref 7–16)
BACTERIA SPEC BFLD CULT: ABNORMAL
BASOPHILS # BLD AUTO: 0.03 K/UL (ref 0–0.2)
BASOPHILS NFR BLD AUTO: 0.2 % (ref 0–1)
BUN SERPL-MCNC: 50 MG/DL (ref 7–18)
BUN/CREAT SERPL: 3 (ref 6–20)
CALCIUM SERPL-MCNC: 7.2 MG/DL (ref 8.5–10.1)
CHLORIDE SERPL-SCNC: 97 MMOL/L (ref 98–107)
CO2 SERPL-SCNC: 32 MMOL/L (ref 21–32)
CREAT SERPL-MCNC: 17.4 MG/DL (ref 0.55–1.02)
DIFFERENTIAL METHOD BLD: ABNORMAL
EGFR (NO RACE VARIABLE) (RUSH/TITUS): 2 ML/MIN/1.73M2
EOSINOPHIL # BLD AUTO: 0.19 K/UL (ref 0–0.5)
EOSINOPHIL NFR BLD AUTO: 1.6 % (ref 1–4)
ERYTHROCYTE [DISTWIDTH] IN BLOOD BY AUTOMATED COUNT: 16.4 % (ref 11.5–14.5)
GLUCOSE SERPL-MCNC: 93 MG/DL (ref 74–106)
HCT VFR BLD AUTO: 29.5 % (ref 38–47)
HGB BLD-MCNC: 9.5 G/DL (ref 12–16)
IMM GRANULOCYTES # BLD AUTO: 0.07 K/UL (ref 0–0.04)
IMM GRANULOCYTES NFR BLD: 0.6 % (ref 0–0.4)
LYMPHOCYTES # BLD AUTO: 0.88 K/UL (ref 1–4.8)
LYMPHOCYTES NFR BLD AUTO: 7.2 % (ref 27–41)
MCH RBC QN AUTO: 26.8 PG (ref 27–31)
MCHC RBC AUTO-ENTMCNC: 32.2 G/DL (ref 32–36)
MCV RBC AUTO: 83.1 FL (ref 80–96)
MONOCYTES # BLD AUTO: 0.71 K/UL (ref 0–0.8)
MONOCYTES NFR BLD AUTO: 5.8 % (ref 2–6)
MPC BLD CALC-MCNC: 9.5 FL (ref 9.4–12.4)
NEUTROPHILS # BLD AUTO: 10.29 K/UL (ref 1.8–7.7)
NEUTROPHILS NFR BLD AUTO: 84.6 % (ref 53–65)
NRBC # BLD AUTO: 0 X10E3/UL
NRBC, AUTO (.00): 0 %
PHOSPHATE SERPL-MCNC: 6.3 MG/DL (ref 2.5–4.5)
PLATELET # BLD AUTO: 197 K/UL (ref 150–400)
POTASSIUM SERPL-SCNC: 3.6 MMOL/L (ref 3.5–5.1)
RBC # BLD AUTO: 3.55 M/UL (ref 4.2–5.4)
SODIUM SERPL-SCNC: 135 MMOL/L (ref 136–145)
VANCOMYCIN SERPL-MCNC: 39.6 ΜG/ML (ref 0–20)
WBC # BLD AUTO: 12.17 K/UL (ref 4.5–11)

## 2023-11-04 PROCEDURE — D9220A PRA ANESTHESIA: Mod: CRNA,,, | Performed by: NURSE ANESTHETIST, CERTIFIED REGISTERED

## 2023-11-04 PROCEDURE — D9220A PRA ANESTHESIA: ICD-10-PCS | Mod: ANES,,, | Performed by: ANESTHESIOLOGY

## 2023-11-04 PROCEDURE — 80202 ASSAY OF VANCOMYCIN: CPT | Performed by: HOSPITALIST

## 2023-11-04 PROCEDURE — 27000655: Performed by: ANESTHESIOLOGY

## 2023-11-04 PROCEDURE — D9220A PRA ANESTHESIA: ICD-10-PCS | Mod: CRNA,,, | Performed by: NURSE ANESTHETIST, CERTIFIED REGISTERED

## 2023-11-04 PROCEDURE — 77001 CHG FLUOROGUIDE CNTRL VEN ACCESS,PLACE,REPLACE,REMOVE: ICD-10-PCS | Mod: 26,,, | Performed by: STUDENT IN AN ORGANIZED HEALTH CARE EDUCATION/TRAINING PROGRAM

## 2023-11-04 PROCEDURE — 99233 PR SUBSEQUENT HOSPITAL CARE,LEVL III: ICD-10-PCS | Mod: GC,,, | Performed by: STUDENT IN AN ORGANIZED HEALTH CARE EDUCATION/TRAINING PROGRAM

## 2023-11-04 PROCEDURE — 36558 INSERT TUNNELED CV CATH: CPT | Mod: 51,RT,, | Performed by: STUDENT IN AN ORGANIZED HEALTH CARE EDUCATION/TRAINING PROGRAM

## 2023-11-04 PROCEDURE — 49422 REMOVE TUNNELED IP CATH: CPT | Mod: ,,, | Performed by: STUDENT IN AN ORGANIZED HEALTH CARE EDUCATION/TRAINING PROGRAM

## 2023-11-04 PROCEDURE — 27000716 HC OXISENSOR PROBE, ANY SIZE: Performed by: ANESTHESIOLOGY

## 2023-11-04 PROCEDURE — 11000001 HC ACUTE MED/SURG PRIVATE ROOM

## 2023-11-04 PROCEDURE — C1750 CATH, HEMODIALYSIS,LONG-TERM: HCPCS | Performed by: STUDENT IN AN ORGANIZED HEALTH CARE EDUCATION/TRAINING PROGRAM

## 2023-11-04 PROCEDURE — 36000707: Performed by: STUDENT IN AN ORGANIZED HEALTH CARE EDUCATION/TRAINING PROGRAM

## 2023-11-04 PROCEDURE — 25000003 PHARM REV CODE 250: Performed by: NURSE ANESTHETIST, CERTIFIED REGISTERED

## 2023-11-04 PROCEDURE — 80069 RENAL FUNCTION PANEL: CPT

## 2023-11-04 PROCEDURE — 36000706: Performed by: STUDENT IN AN ORGANIZED HEALTH CARE EDUCATION/TRAINING PROGRAM

## 2023-11-04 PROCEDURE — 27000177 HC AIRWAY, LARYNGEAL MASK: Performed by: ANESTHESIOLOGY

## 2023-11-04 PROCEDURE — 25000003 PHARM REV CODE 250: Performed by: STUDENT IN AN ORGANIZED HEALTH CARE EDUCATION/TRAINING PROGRAM

## 2023-11-04 PROCEDURE — D9220A PRA ANESTHESIA: Mod: ANES,,, | Performed by: ANESTHESIOLOGY

## 2023-11-04 PROCEDURE — 37000008 HC ANESTHESIA 1ST 15 MINUTES: Performed by: STUDENT IN AN ORGANIZED HEALTH CARE EDUCATION/TRAINING PROGRAM

## 2023-11-04 PROCEDURE — 27000510 HC BLANKET BAIR HUGGER ANY SIZE: Performed by: ANESTHESIOLOGY

## 2023-11-04 PROCEDURE — 37000009 HC ANESTHESIA EA ADD 15 MINS: Performed by: STUDENT IN AN ORGANIZED HEALTH CARE EDUCATION/TRAINING PROGRAM

## 2023-11-04 PROCEDURE — 25000003 PHARM REV CODE 250

## 2023-11-04 PROCEDURE — 49422 PR REMOVAL TUNNELED INTRAPERITONEAL CATHETER: ICD-10-PCS | Mod: ,,, | Performed by: STUDENT IN AN ORGANIZED HEALTH CARE EDUCATION/TRAINING PROGRAM

## 2023-11-04 PROCEDURE — 36558 PR INSERT TUNNELED CV CATH W/O PORT OR PUMP: ICD-10-PCS | Mod: 51,RT,, | Performed by: STUDENT IN AN ORGANIZED HEALTH CARE EDUCATION/TRAINING PROGRAM

## 2023-11-04 PROCEDURE — 85025 COMPLETE CBC W/AUTO DIFF WBC: CPT

## 2023-11-04 PROCEDURE — 99233 SBSQ HOSP IP/OBS HIGH 50: CPT | Mod: GC,,, | Performed by: STUDENT IN AN ORGANIZED HEALTH CARE EDUCATION/TRAINING PROGRAM

## 2023-11-04 PROCEDURE — 77001 FLUOROGUIDE FOR VEIN DEVICE: CPT | Mod: 26,,, | Performed by: STUDENT IN AN ORGANIZED HEALTH CARE EDUCATION/TRAINING PROGRAM

## 2023-11-04 PROCEDURE — 71000033 HC RECOVERY, INTIAL HOUR: Performed by: STUDENT IN AN ORGANIZED HEALTH CARE EDUCATION/TRAINING PROGRAM

## 2023-11-04 PROCEDURE — 63600175 PHARM REV CODE 636 W HCPCS: Performed by: NURSE ANESTHETIST, CERTIFIED REGISTERED

## 2023-11-04 DEVICE — GLIDEPATH HEMODIALYSIS CATH, ST, DL, 14.5 FR. 42CM
Type: IMPLANTABLE DEVICE | Site: GROIN | Status: FUNCTIONAL
Brand: GLIDEPATH LONG-TERM HEMODIALYSIS CATHETER WITH PRELOADED STYLET

## 2023-11-04 RX ORDER — GLYCOPYRROLATE 0.2 MG/ML
INJECTION INTRAMUSCULAR; INTRAVENOUS
Status: DISCONTINUED | OUTPATIENT
Start: 2023-11-04 | End: 2023-11-04

## 2023-11-04 RX ORDER — EPHEDRINE SULFATE 50 MG/ML
INJECTION, SOLUTION INTRAVENOUS
Status: DISCONTINUED | OUTPATIENT
Start: 2023-11-04 | End: 2023-11-04

## 2023-11-04 RX ORDER — MEPERIDINE HYDROCHLORIDE 25 MG/ML
25 INJECTION INTRAMUSCULAR; INTRAVENOUS; SUBCUTANEOUS EVERY 10 MIN PRN
Status: DISCONTINUED | OUTPATIENT
Start: 2023-11-04 | End: 2023-11-04 | Stop reason: HOSPADM

## 2023-11-04 RX ORDER — ONDANSETRON 2 MG/ML
4 INJECTION INTRAMUSCULAR; INTRAVENOUS DAILY PRN
Status: DISCONTINUED | OUTPATIENT
Start: 2023-11-04 | End: 2023-11-04 | Stop reason: HOSPADM

## 2023-11-04 RX ORDER — DIPHENHYDRAMINE HYDROCHLORIDE 50 MG/ML
25 INJECTION INTRAMUSCULAR; INTRAVENOUS EVERY 6 HOURS PRN
Status: DISCONTINUED | OUTPATIENT
Start: 2023-11-04 | End: 2023-11-04 | Stop reason: HOSPADM

## 2023-11-04 RX ORDER — ONDANSETRON 2 MG/ML
INJECTION INTRAMUSCULAR; INTRAVENOUS
Status: DISCONTINUED | OUTPATIENT
Start: 2023-11-04 | End: 2023-11-04

## 2023-11-04 RX ORDER — FENTANYL CITRATE 50 UG/ML
INJECTION, SOLUTION INTRAMUSCULAR; INTRAVENOUS
Status: DISCONTINUED | OUTPATIENT
Start: 2023-11-04 | End: 2023-11-04

## 2023-11-04 RX ORDER — PROPOFOL 10 MG/ML
VIAL (ML) INTRAVENOUS
Status: DISCONTINUED | OUTPATIENT
Start: 2023-11-04 | End: 2023-11-04

## 2023-11-04 RX ORDER — HYDROMORPHONE HYDROCHLORIDE 2 MG/ML
0.5 INJECTION, SOLUTION INTRAMUSCULAR; INTRAVENOUS; SUBCUTANEOUS EVERY 5 MIN PRN
Status: DISCONTINUED | OUTPATIENT
Start: 2023-11-04 | End: 2023-11-04 | Stop reason: HOSPADM

## 2023-11-04 RX ORDER — MORPHINE SULFATE 10 MG/ML
4 INJECTION INTRAMUSCULAR; INTRAVENOUS; SUBCUTANEOUS EVERY 5 MIN PRN
Status: DISCONTINUED | OUTPATIENT
Start: 2023-11-04 | End: 2023-11-04 | Stop reason: HOSPADM

## 2023-11-04 RX ORDER — LEVOTHYROXINE SODIUM 100 UG/1
200 TABLET ORAL
Status: DISCONTINUED | OUTPATIENT
Start: 2023-11-05 | End: 2023-11-07 | Stop reason: HOSPADM

## 2023-11-04 RX ORDER — LIDOCAINE HYDROCHLORIDE 20 MG/ML
INJECTION, SOLUTION EPIDURAL; INFILTRATION; INTRACAUDAL; PERINEURAL
Status: DISCONTINUED | OUTPATIENT
Start: 2023-11-04 | End: 2023-11-04

## 2023-11-04 RX ADMIN — MIDODRINE HYDROCHLORIDE 10 MG: 5 TABLET ORAL at 03:11

## 2023-11-04 RX ADMIN — LIDOCAINE HYDROCHLORIDE 50 MG: 20 INJECTION, SOLUTION INTRAVENOUS at 08:11

## 2023-11-04 RX ADMIN — MIDODRINE HYDROCHLORIDE 10 MG: 5 TABLET ORAL at 05:11

## 2023-11-04 RX ADMIN — SODIUM CHLORIDE: 9 INJECTION, SOLUTION INTRAVENOUS at 08:11

## 2023-11-04 RX ADMIN — FENTANYL CITRATE 50 MCG: 50 INJECTION INTRAMUSCULAR; INTRAVENOUS at 08:11

## 2023-11-04 RX ADMIN — OXYCODONE HYDROCHLORIDE 10 MG: 5 TABLET ORAL at 10:11

## 2023-11-04 RX ADMIN — GLYCOPYRROLATE 0.4 MG: 0.2 INJECTION INTRAMUSCULAR; INTRAVENOUS at 08:11

## 2023-11-04 RX ADMIN — EPHEDRINE SULFATE 5 MG: 50 INJECTION INTRAVENOUS at 08:11

## 2023-11-04 RX ADMIN — CALCIUM CHLORIDE 1 G: 100 INJECTION INTRAVENOUS; INTRAVENTRICULAR at 08:11

## 2023-11-04 RX ADMIN — LEVOTHYROXINE SODIUM 150 MCG: 0.15 TABLET ORAL at 05:11

## 2023-11-04 RX ADMIN — EPHEDRINE SULFATE 15 MG: 50 INJECTION INTRAVENOUS at 08:11

## 2023-11-04 RX ADMIN — ONDANSETRON 4 MG: 2 INJECTION INTRAMUSCULAR; INTRAVENOUS at 08:11

## 2023-11-04 RX ADMIN — PROPOFOL 150 MG: 10 INJECTION, EMULSION INTRAVENOUS at 08:11

## 2023-11-04 RX ADMIN — FAMOTIDINE 20 MG: 20 TABLET ORAL at 09:11

## 2023-11-04 RX ADMIN — MIDODRINE HYDROCHLORIDE 10 MG: 5 TABLET ORAL at 09:11

## 2023-11-04 NOTE — PROGRESS NOTES
[facility] Hospital Medicine  Progress Note    Patient Name: Marian Tabares  MRN: [id number]  Patient Class: IP- Inpatient   Admission Date: 11/2/2023  Length of Stay: 1 days  Attending Physician: Aden Cornejo DO  Primary Care Provider: Darlene, Primary Doctor        Subjective:     Principal Problem:Severe sepsis        HPI:  Patient is a 42 yo female who presents to the hospital with a complaint of abdominal pain and hypotension. Patient has a history of ESRD since 2007 due to  HIV and is on home PD every night. She described that pain as a diffuse abdominal pain more pronounced at the periumbilical region that started 14 days ago but worsened over the past few days. Associated symptoms include chills, nausea, vomiting and bloating, but denies fever, chest pain, palpitations, and shortness of breath. She was seen by her PCP who told her that she could have a fungal infection and was thus started on fungal infection without improvement of her symptoms, which culminated in this ER visit today. Of note, she was admitted at [facility] in May 2022 for abdominal pain and hypotension due to recurrent peritonitis, but they were unable to identify the organism and infectious disease recommended empiric antibiotics through vancomycin and Cefepime for three weeks. Prior to that, she was admitted to ICU at [facility] for syncope and septic shock due to MSSA peritonitis. Patient noted that the pain is similar to the one she had during her previous episodes of peritonitis.     At the emergency department, the patient was afebrile and vital signs with /54, Pulse 87, RR 18 and % at room air on arrival and then about four hours later she become hypotensive with BP of 89/51 and tachypneic with RR 22 meeting sepsis while in the emergency department. She was already started on antibiotics prior to meeting sepsis criteria. Nevertheless, she was treated per sepsis protocol with gentle hydration due to ESRD along  with blood collection for culture and lactic acid level. Ensuring work up revealed paracentesis suggestive of peritonitis with cell count excluding RBCs of 3589 and Polys 72. CBC was normal with WBC 8.8, H&H 11.1/36.4 and . CMP was significant for Na 136, hypokalemia with K 2.7, BUN/CR 36/15 with baseline CR of 13.3 and GFR 3. Mg of 1.1. Patient will be admitted for further evaluation and management.                 Overview/Hospital Course:  No notes on file    Interval History: Patient had HD catheter put in and peritoneal catheter removed in the OR today. Plan for hemodialysis Monday. On fluconazole for fungal peritonitis.     Review of Systems   Constitutional:  Negative for chills, diaphoresis, fatigue and fever.   HENT:  Negative for sinus pressure, sinus pain, sore throat and tinnitus.    Eyes:  Negative for visual disturbance.   Respiratory:  Negative for cough, chest tightness and shortness of breath.    Cardiovascular:  Negative for chest pain, palpitations and leg swelling.   Gastrointestinal:  Positive for abdominal distention and abdominal pain. Negative for anal bleeding, nausea and vomiting.   Genitourinary:  Negative for hematuria.   Musculoskeletal:  Negative for neck pain and neck stiffness.   Skin:  Negative for wound.   Neurological:  Negative for tremors, seizures, syncope and speech difficulty.   Psychiatric/Behavioral:  Negative for agitation, behavioral problems and confusion.      Objective:     Vital Signs (Most Recent):  Temp: (!) 95 °F (35 °C) (11/04/23 0952)  Pulse: 101 (11/04/23 1025)  Resp: 16 (11/04/23 1025)  BP: 101/66 (11/04/23 1020)  SpO2: 95 % (11/04/23 1025) Vital Signs (24h Range):  Temp:  [95 °F (35 °C)-98.3 °F (36.8 °C)] 95 °F (35 °C)  Pulse:  [] 101  Resp:  [14-20] 16  SpO2:  [93 %-100 %] 95 %  BP: ()/(44-71) 101/66     Weight: 77.6 kg (171 lb)  Body mass index is 26.78 kg/m².    Intake/Output Summary (Last 24 hours) at 11/4/2023 1201  Last data filed at  11/4/2023 0946  Gross per 24 hour   Intake 125 ml   Output --   Net 125 ml         Physical Exam  Vitals and nursing note reviewed.   Constitutional:       General: She is not in acute distress.     Appearance: Normal appearance. She is ill-appearing.   HENT:      Head: Normocephalic and atraumatic.      Right Ear: External ear normal.      Left Ear: External ear normal.      Nose: Nose normal.      Mouth/Throat:      Mouth: Mucous membranes are dry.      Pharynx: Oropharynx is clear.   Eyes:      General:         Right eye: No discharge.         Left eye: No discharge.      Conjunctiva/sclera: Conjunctivae normal.   Cardiovascular:      Rate and Rhythm: Normal rate and regular rhythm.      Pulses: Normal pulses.      Heart sounds: Normal heart sounds.   Pulmonary:      Effort: Pulmonary effort is normal.      Breath sounds: Normal breath sounds.   Abdominal:      General: There is distension.      Tenderness: There is abdominal tenderness. There is no guarding or rebound.      Hernia: A hernia is present.      Comments: Umbilical hernia noted   Musculoskeletal:      Cervical back: Neck supple. No tenderness.      Right lower leg: No edema.      Left lower leg: No edema.   Skin:     General: Skin is warm.   Neurological:      General: No focal deficit present.      Mental Status: She is alert and oriented to person, place, and time.   Psychiatric:         Mood and Affect: Mood normal.         Behavior: Behavior normal.         Thought Content: Thought content normal.         Judgment: Judgment normal.             Significant Labs: All pertinent labs within the past 24 hours have been reviewed.    Significant Imaging: I have reviewed all pertinent imaging results/findings within the past 24 hours.      Assessment/Plan:      * Severe sepsis  This patient does have evidence of infective focus  My overall impression is sepsis.  Source: Abdominal  Antibiotics given-   Antibiotics (72h ago, onward)    None        Latest  lactate reviewed-  Recent Labs   Lab 11/03/23  0538   LACTATE 0.4     Organ dysfunction indicated by Acute liver injury    Fluid challenge Other- Patient to receive 250 ml volume other than 30cc/kg due to End Stage Renal Disease     Post- resuscitation assessment Yes Perfusion exam was performed within 6 hours of septic shock presentation after bolus shows Adequate tissue perfusion assessed by non-invasive monitoring       Will Not start Pressors- Levophed for MAP of 65  Source control achieved by: antibiotics    11/4  Bcx ng at 24 hours  Continue fluconazole for fungal peritonitis       Peritonitis  - Continue levaquin  - CBC and renal panel in AM  - Tylenol and pain medication PRN  - Monitor CBC daily    11/4  Peritonitis fungal as confirmed by nephrology last week  Fluconazole 200mg daily       ESRD (end stage renal disease)  - BUN/CR 36/15  - Avoid nephrotoxic drugs  - Renal dose applicable medication  - Strict I and O  - Monitor renal function daily  - Nephrology consulted for dialysis, thanks for the assistance    11/4  Peritoneal catheter replaced by HD catheter in OR  Plan for hemodialysis Monday   Crt 17.4     Electrolyte abnormality  - Mg 1.1 and K 2.7  - Replacing Mg and recheck lab in lab  - Replacing K but patient is ESRD on PD  - Nephrology consulted for dialysis, thanks for the assistance    11/4  Plan for hemodialysis Monday  Potassium wnl      Human immunodeficiency virus (HIV) disease  Continue home medication while at the hospital    11/4  Continue HIV medications     Hypotension  Continue home midodrine and monitor blood pressure    11/4  Latest /66  Continue midodrine    Postoperative hypothyroidism  Patient with history of Grave Disease post thyroidectomy  Pending TSH  Adjust the dose of home levothyroxine as indicated     11/4    Increase synthroid to 200mcg daily    Depression  Patient has persistent depression which is moderate and is currently controlled. Will Continue  anti-depressant medications. We will not consult psychiatry at this time. Patient does not display psychosis at this time. Continue to monitor closely and adjust plan of care as needed.        GERD (gastroesophageal reflux disease)  Continue home pepcid        VTE Risk Mitigation (From admission, onward)         Ordered     Place sequential compression device  Until discontinued         11/03/23 0355                Discharge Planning   AYUSH:      Code Status: Full Code   Is the patient medically ready for discharge?:     Reason for patient still in hospital (select all that apply): Treatment  Discharge Plan A: Home                  Hyacinth Sheehan MD  Department of Hospital Medicine   [facility]

## 2023-11-04 NOTE — PROGRESS NOTES
[facility] - [facility]i Services  Nephrology  Progress Note    Patient Name: Marian Tabares  MRN: [id number]  Admission Date: 11/2/2023  Hospital Length of Stay: 1 days  Attending Provider: Aden Cornejo DO   Primary Care Physician: Darlene, Primary Doctor  Principal Problem:Severe sepsis    Consults  Subjective:     Interval History: Ms. Tabares is seen in recovery post peritoneal catheter removal and tunneled catheter placement. . Awake and stable    Review of patient's allergies indicates:   Allergen Reactions    Ceftazidime Itching    Iron sucrose Itching    Sodium ferric gluconat-sucrose Itching    Cefazolin Itching    Gentamicin Itching     Not allergic     Iron Itching    Soap Hives    Sodium ferric gluconate complex Itching    Sucrose Itching    Adhesive Hives and Rash     No surgical tape    Povidone-iodine Itching, Rash and Hives     Burning to skin  blisters       Current Facility-Administered Medications   Medication Frequency    acetaminophen tablet 1,000 mg Q6H PRN    bisacodyL EC tablet 10 mg Daily PRN    dextromethorphan-guaiFENesin  mg/5 ml liquid 10 mL Q6H PRN    diphenhydrAMINE injection 25 mg Q6H PRN    dolutegravir Tab 50 mg Daily    doravirine Tab 100 mg Daily    EScitalopram oxalate tablet 20 mg Daily    famotidine tablet 20 mg QHS    fluconazole tablet 200 mg Daily    HYDROmorphone (PF) injection 0.5 mg Q5 Min PRN    [START ON 11/5/2023] levothyroxine tablet 200 mcg Before breakfast    meperidine (PF) injection 25 mg Q10 Min PRN    midodrine tablet 10 mg Q8H    morphine injection 4 mg Q4H PRN    morphine injection 4 mg Q5 Min PRN    ondansetron injection 4 mg Daily PRN    ondansetron injection 8 mg Q6H PRN    oxyCODONE immediate release tablet 10 mg Q4H PRN    simethicone chewable tablet 80 mg TID PRN    traZODone tablet 50 mg Nightly PRN       Objective:     Vital Signs (Most Recent):  Temp: (!) 95 °F (35 °C) (11/04/23 0952)  Pulse: 102 (11/04/23 1005)  Resp: 16 (11/04/23  1005)  BP: 100/65 (11/04/23 1005)  SpO2: 97 % (11/04/23 1005) Vital Signs (24h Range):  Temp:  [95 °F (35 °C)-98.3 °F (36.8 °C)] 95 °F (35 °C)  Pulse:  [] 102  Resp:  [12-26] 16  SpO2:  [93 %-100 %] 97 %  BP: ()/(44-71) 100/65     Weight: 77.6 kg (171 lb) (11/03/23 1332)  Body mass index is 26.78 kg/m².  Body surface area is 1.92 meters squared.    I/O last 3 completed shifts:  In: 1300 [I.V.:100; IV Piggyback:1200]  Out: -     Physical Exam Awake. No distress.  systolic    Significant Labs:sureBMP:   Recent Labs   Lab 11/03/23  0508 11/04/23  0411   GLU  --  93   NA  --  135*   K 2.8* 3.6   CL  --  97*   CO2  --  32   BUN  --  50*   CREATININE  --  17.40*   CALCIUM  --  7.2*   MG 1.3*  --        Significant Imaging:  Labs: Reviewed    Assessment/Plan:     Active Diagnoses:    Diagnosis Date Noted POA    PRINCIPAL PROBLEM:  Severe sepsis [A41.9, R65.20] 11/03/2023 Yes    Peritonitis [K65.9] 11/03/2023 Yes    GERD (gastroesophageal reflux disease) [K21.9] 11/03/2023 Yes    Postoperative hypothyroidism [E89.0] 11/03/2023 Yes    Electrolyte abnormality [E87.8] 11/03/2023 Yes    Depression [F32.A] 11/03/2023 Yes    Hypotension [I95.9] 11/03/2023 Yes    Human immunodeficiency virus (HIV) disease [B20]  Yes    ESRD on peritoneal dialysis [N18.6, Z99.2]  Not Applicable      Problems Resolved During this Admission:       We'll plan hemodialysis for Monday. She'll continue Diflucan and should clear her fungal peritonitis with this and the peritoneal cath out.    Thank you for your consult. I will follow-up with patient. Please contact us if you have any additional questions.    Zachary Lemos MD  Nephrology  [facility]

## 2023-11-04 NOTE — ASSESSMENT & PLAN NOTE
- BUN/CR 36/15  - Avoid nephrotoxic drugs  - Renal dose applicable medication  - Strict I and O  - Monitor renal function daily  - Nephrology consulted for dialysis, thanks for the assistance    11/4  Peritoneal catheter replaced by HD catheter in OR  Plan for hemodialysis Monday   Crt 17.4

## 2023-11-04 NOTE — ASSESSMENT & PLAN NOTE
Patient with history of Grave Disease post thyroidectomy  Pending TSH  Adjust the dose of home levothyroxine as indicated     11/4    Increase synthroid to 200mcg daily

## 2023-11-04 NOTE — ANESTHESIA POSTPROCEDURE EVALUATION
Anesthesia Post Evaluation    Patient: Marian Tabares    Procedure(s) Performed: Procedure(s) (LRB):  INSERTION, CATHETER, TUNNELED (N/A)  REMOVAL, CATHETER, DIALYSIS, PERITONEAL (N/A)    Final Anesthesia Type: general      Patient location during evaluation: PACU  Patient participation: Yes- Able to Participate  Level of consciousness: awake and alert  Post-procedure vital signs: reviewed and stable  Pain management: adequate  Airway patency: patent    PONV status at discharge: No PONV  Anesthetic complications: no      Cardiovascular status: hemodynamically stable  Respiratory status: unassisted  Hydration status: euvolemic  Follow-up not needed.          Vitals Value Taken Time   /66 11/04/23 1020   Temp 35 °C (95 °F) 11/04/23 0952   Pulse 101 11/04/23 1025   Resp 16 11/04/23 1025   SpO2 95 % 11/04/23 1025         Event Time   Out of Recovery 11/04/2023 10:25:00         Pain/Yoandy Score: Pain Rating Prior to Med Admin: 7 (not time for IV medication) (11/3/2023  3:53 PM)  Pain Rating Post Med Admin: 0 (11/3/2023  4:53 PM)  Yoandy Score: 10 (11/4/2023 10:15 AM)

## 2023-11-04 NOTE — ASSESSMENT & PLAN NOTE
This patient does have evidence of infective focus  My overall impression is sepsis.  Source: Abdominal  Antibiotics given-   Antibiotics (72h ago, onward)    None        Latest lactate reviewed-  Recent Labs   Lab 11/03/23  0538   LACTATE 0.4     Organ dysfunction indicated by Acute liver injury    Fluid challenge Other- Patient to receive 250 ml volume other than 30cc/kg due to End Stage Renal Disease     Post- resuscitation assessment Yes Perfusion exam was performed within 6 hours of septic shock presentation after bolus shows Adequate tissue perfusion assessed by non-invasive monitoring       Will Not start Pressors- Levophed for MAP of 65  Source control achieved by: antibiotics    11/4  Bcx ng at 24 hours  Continue fluconazole for fungal peritonitis

## 2023-11-04 NOTE — PLAN OF CARE
Problem: Adult Inpatient Plan of Care  Goal: Plan of Care Review  11/4/2023 1359 by Abiola Jaeger RN  Outcome: Ongoing, Progressing  11/4/2023 1359 by Abiola Jaeger RN  Outcome: Ongoing, Progressing  Goal: Patient-Specific Goal (Individualized)  11/4/2023 1359 by Abiola Jaeger RN  Outcome: Ongoing, Progressing  11/4/2023 1359 by Abiola Jaeger RN  Outcome: Ongoing, Progressing  Goal: Absence of Hospital-Acquired Illness or Injury  11/4/2023 1359 by Abiola Jaeger RN  Outcome: Ongoing, Progressing  11/4/2023 1359 by Abiola Jaeger RN  Outcome: Ongoing, Progressing  Goal: Optimal Comfort and Wellbeing  11/4/2023 1359 by Abiola Jaeger RN  Outcome: Ongoing, Progressing  11/4/2023 1359 by Abiola Jaeger RN  Outcome: Ongoing, Progressing  Goal: Readiness for Transition of Care  11/4/2023 1359 by Abiola Jaeger RN  Outcome: Ongoing, Progressing  11/4/2023 1359 by Abiola Jaeger RN  Outcome: Ongoing, Progressing     Problem: Adjustment to Illness (Sepsis/Septic Shock)  Goal: Optimal Coping  11/4/2023 1359 by Abiola Jaeger RN  Outcome: Ongoing, Progressing  11/4/2023 1359 by Abiola Jaeger RN  Outcome: Ongoing, Progressing     Problem: Bleeding (Sepsis/Septic Shock)  Goal: Absence of Bleeding  11/4/2023 1359 by Abiola Jaeger RN  Outcome: Ongoing, Progressing  11/4/2023 1359 by Abiola Jaeger RN  Outcome: Ongoing, Progressing     Problem: Glycemic Control Impaired (Sepsis/Septic Shock)  Goal: Blood Glucose Level Within Desired Range  11/4/2023 1359 by Abiola Jaeger RN  Outcome: Ongoing, Progressing  11/4/2023 1359 by Abiola Jaeger RN  Outcome: Ongoing, Progressing     Problem: Infection Progression (Sepsis/Septic Shock)  Goal: Absence of Infection Signs and Symptoms  11/4/2023 1359 by Abiola Jaeger RN  Outcome: Ongoing, Progressing  11/4/2023 1359 by Abiola Jaeger RN  Outcome: Ongoing, Progressing     Problem: Nutrition Impaired (Sepsis/Septic Shock)  Goal: Optimal Nutrition Intake  11/4/2023 1359 by Mil  VAL Culver  Outcome: Ongoing, Progressing  11/4/2023 1359 by Abiola Jaeger RN  Outcome: Ongoing, Progressing     Problem: Fall Injury Risk  Goal: Absence of Fall and Fall-Related Injury  11/4/2023 1359 by Abiola Jaeger RN  Outcome: Ongoing, Progressing  11/4/2023 1359 by Abiola Jaeger RN  Outcome: Ongoing, Progressing     Problem: Pain Acute  Goal: Acceptable Pain Control and Functional Ability  11/4/2023 1359 by Abiola Jaeger RN  Outcome: Ongoing, Progressing  11/4/2023 1359 by Abiola Jaeger RN  Outcome: Ongoing, Progressing     Problem: Fatigue  Goal: Improved Activity Tolerance  11/4/2023 1359 by Abiola Jaeger RN  Outcome: Ongoing, Progressing  11/4/2023 1359 by Abiola Jaeger RN  Outcome: Ongoing, Progressing     Problem: Infection  Goal: Absence of Infection Signs and Symptoms  11/4/2023 1359 by Abiola Jaeger RN  Outcome: Ongoing, Progressing  11/4/2023 1359 by Abiola Jaeger RN  Outcome: Ongoing, Progressing     Problem: Activity Intolerance  Goal: Enhanced Capacity and Energy  11/4/2023 1359 by Abiola Jaeger RN  Outcome: Ongoing, Progressing  11/4/2023 1359 by Abiola Jaegre RN  Outcome: Ongoing, Progressing

## 2023-11-04 NOTE — ASSESSMENT & PLAN NOTE
- Continue levaquin  - CBC and renal panel in AM  - Tylenol and pain medication PRN  - Monitor CBC daily    11/4  Peritonitis fungal as confirmed by nephrology last week  Fluconazole 200mg daily

## 2023-11-04 NOTE — PLAN OF CARE
0943  pt to pacu pt resp reg and non labored pt sleepy pt awakens and follows commands pt sao2 100% n 7l fm pt dsg d/I to abd abd and r groin no bleeding noted temp 94 pt placed on warming blanket    1005  pt vs stable pt resting well pt voices no complaints pain level 0 temp rechecl 94.5 oral  1020 pt temp 97.3 pt dsg d/I to r omar no bleeding noted pt vs stable  pt pain level 0 orders to release to room per md    1035 pt released to prateek armenta rnb/p 101/67 pulse 63 resp 16 sao2 98%on ra temp 97.5 oral

## 2023-11-04 NOTE — OP NOTE
[facility] - Periop Services  Surgery Department  Operative Note    SUMMARY     Date of Procedure: 11/4/2023     Procedure: Procedure(s) (LRB):  INSERTION, CATHETER, TUNNELED (N/A)  REMOVAL, CATHETER, DIALYSIS, PERITONEAL (N/A)     Surgeon(s) and Role:     * Jules Álvarez DO - Primary    Assisting Surgeon: None    Pre-Operative Diagnosis: Peritonitis [K65.9]  ESRD on peritoneal dialysis [N18.6, Z99.2]    Post-Operative Diagnosis: Post-Op Diagnosis Codes:     * Peritonitis [K65.9]     * ESRD on peritoneal dialysis [N18.6, Z99.2]    Anesthesia: General/MAC    Operative Findings (including complications, if any):  Occluded bilateral internal jugular veins, occluded left femoral vein; placed tunneled dialysis catheter in the right femoral vein; remove peritoneal dialysis catheter without difficulty    Description of Technical Procedures:  Patient was taken the operating room and placed on the operating table in the supine position.  Patient underwent general anesthesia per the anesthesia team.  The bilateral neck, chest, abdomen and pelvis were then prepped and draped in usual sterile fashion.  We used an ultrasound and evaluated bilateral necks and found both internal jugular veins to be occluded.  We then looked at the left common femoral vein and found this to be stenosed; we identified the right common femoral vein and found this to be patent.  We localize with 0.25% Marcaine plain.  We then cannulated the right femoral vein with an 18 gauge needle under ultrasound guidance; we had dark, nonpulsatile blood return.  We then threaded a wire into the IVC under guidance with fluoroscopy.  We then tunneled a 42 cm tunneled dialysis catheter from the right lower quadrant of the abdomen and the skin tunneled in the subcutaneous tissue and exited next the wire site.  We then passed a series of dilators using Seldinger fashion, followed by a dilator with peel-away sheath and removed the dilator and the wire and  then passed the catheter through the sheath and removed the sheath.  We confirmed adequate placement with fluoroscopy.  We had good blood return from both ports and flushed both ports with heparinized saline.  We then secured the catheter to the skin with 2-0 silk sutures, placed a Biopatch and sterile dressing.  We closed the groin incision with 3-0 Vicryl simple interrupted sutures and placed Mastisol, Steri-Strips and sterile dressing.  At this time we turned our attention to the abdomen.  We localized around the peritoneal dialysis catheter and then used a hemostat to probe and break up tissue around the cuff in the subcutaneous tissue.  We freed up the catheter all way down to the fascia.  We then made an incision on the abdomen with a 15 blade scalpel and dissected down through subcutaneous tissue all way down to the fascia due to the amount of tunneling that was present.  We found the 2nd cuff attached in the fascia and freed this tissue up and then was able to completely remove the catheter and the catheter was intact.  We placed a #1 PDS figure-of-eight suture to close the fascial defect.  We then irrigated the cavity and suctioned clear, bleeding controlled electrocautery.  We then approximated the skin edges with staples.  Sterile dressing applied.  Patient was awakened, extubated and taken the PACU in stable condition.  Patient tolerated procedure well.        Estimated Blood Loss (EBL): 15cc           Implants:   Implant Name Type Inv. Item Serial No.  Lot No. LRB No. Used Action   CATH GLIDEPATH STR 14.5 [id number] - [id number] Dialysis Catheter CATH GLIDEPATH STR 14.5 [id number]  [employer] [id number] Right 1 Implanted       Specimens:   Specimen (24h ago, onward)      None                    Condition: Good    Disposition: PACU - hemodynamically stable.    Attestation: I was present and scrubbed for the entire procedure.

## 2023-11-04 NOTE — ANESTHESIA PROCEDURE NOTES
Intubation    Date/Time: 11/4/2023 8:25 AM    Performed by: Zenaida Garcia CRNA  Authorized by: Jag Walters MD    Intubation:     Induction:  Intravenous    Intubated:  Postinduction    Mask Ventilation:  N/a    Attempts:  1    Attempted By:  CRNA    Difficult Airway Encountered?: No      Complications:  None    Airway Device:  Supraglottic airway/LMA    Airway Device Size:  4.0    Style/Cuff Inflation:  Cuffed    Secured at:  The lips    Placement Verified By:  Capnometry    Complicating Factors:  None    Findings Post-Intubation:  BS equal bilateral and atraumatic/condition of teeth unchanged

## 2023-11-04 NOTE — TRANSFER OF CARE
"Anesthesia Transfer of Care Note    Patient: Marian Tabares    Procedure(s) Performed: Procedure(s) (LRB):  INSERTION, CATHETER, TUNNELED (N/A)  REMOVAL, CATHETER, DIALYSIS, PERITONEAL (N/A)    Patient location: PACU    Anesthesia Type: general    Transport from OR: Transported from OR on room air with adequate spontaneous ventilation    Post pain: adequate analgesia    Post assessment: no apparent anesthetic complications    Post vital signs: stable    Level of consciousness: responds to stimulation and oriented    Nausea/Vomiting: no nausea/vomiting    Complications: none    Transfer of care protocol was followed      Last vitals:   Visit Vitals  /68 (Patient Position: Lying)   Pulse 100   Temp (!) 35 °C (95 °F) (Axillary)   Resp 15   Ht 5' 7" (1.702 m)   Wt 77.6 kg (171 lb)   SpO2 99%   Breastfeeding No   BMI 26.78 kg/m²     "

## 2023-11-04 NOTE — PLAN OF CARE
Problem: Adult Inpatient Plan of Care  Goal: Plan of Care Review  Outcome: Ongoing, Progressing  Goal: Patient-Specific Goal (Individualized)  Outcome: Ongoing, Progressing  Goal: Absence of Hospital-Acquired Illness or Injury  Outcome: Ongoing, Progressing  Goal: Optimal Comfort and Wellbeing  Outcome: Ongoing, Progressing  Goal: Readiness for Transition of Care  Outcome: Ongoing, Progressing     Problem: Adjustment to Illness (Sepsis/Septic Shock)  Goal: Optimal Coping  Outcome: Ongoing, Progressing     Problem: Bleeding (Sepsis/Septic Shock)  Goal: Absence of Bleeding  Outcome: Ongoing, Progressing     Problem: Glycemic Control Impaired (Sepsis/Septic Shock)  Goal: Blood Glucose Level Within Desired Range  Outcome: Ongoing, Progressing     Problem: Infection Progression (Sepsis/Septic Shock)  Goal: Absence of Infection Signs and Symptoms  Outcome: Ongoing, Progressing     Problem: Nutrition Impaired (Sepsis/Septic Shock)  Goal: Optimal Nutrition Intake  Outcome: Ongoing, Progressing     Problem: Fall Injury Risk  Goal: Absence of Fall and Fall-Related Injury  Outcome: Ongoing, Progressing     Problem: Pain Acute  Goal: Acceptable Pain Control and Functional Ability  Outcome: Ongoing, Progressing     Problem: Fatigue  Goal: Improved Activity Tolerance  Outcome: Ongoing, Progressing     Problem: Infection  Goal: Absence of Infection Signs and Symptoms  Outcome: Ongoing, Progressing     Problem: Activity Intolerance  Goal: Enhanced Capacity and Energy  Outcome: Ongoing, Progressing

## 2023-11-04 NOTE — ASSESSMENT & PLAN NOTE
- Mg 1.1 and K 2.7  - Replacing Mg and recheck lab in lab  - Replacing K but patient is ESRD on PD  - Nephrology consulted for dialysis, thanks for the assistance    11/4  Plan for hemodialysis Monday  Potassium viky

## 2023-11-04 NOTE — SUBJECTIVE & OBJECTIVE
Interval History: Patient had HD catheter put in and peritoneal catheter removed in the OR today. Plan for hemodialysis Monday. On fluconazole for fungal peritonitis.     Review of Systems   Constitutional:  Negative for chills, diaphoresis, fatigue and fever.   HENT:  Negative for sinus pressure, sinus pain, sore throat and tinnitus.    Eyes:  Negative for visual disturbance.   Respiratory:  Negative for cough, chest tightness and shortness of breath.    Cardiovascular:  Negative for chest pain, palpitations and leg swelling.   Gastrointestinal:  Positive for abdominal distention and abdominal pain. Negative for anal bleeding, nausea and vomiting.   Genitourinary:  Negative for hematuria.   Musculoskeletal:  Negative for neck pain and neck stiffness.   Skin:  Negative for wound.   Neurological:  Negative for tremors, seizures, syncope and speech difficulty.   Psychiatric/Behavioral:  Negative for agitation, behavioral problems and confusion.      Objective:     Vital Signs (Most Recent):  Temp: (!) 95 °F (35 °C) (11/04/23 0952)  Pulse: 101 (11/04/23 1025)  Resp: 16 (11/04/23 1025)  BP: 101/66 (11/04/23 1020)  SpO2: 95 % (11/04/23 1025) Vital Signs (24h Range):  Temp:  [95 °F (35 °C)-98.3 °F (36.8 °C)] 95 °F (35 °C)  Pulse:  [] 101  Resp:  [14-20] 16  SpO2:  [93 %-100 %] 95 %  BP: ()/(44-71) 101/66     Weight: 77.6 kg (171 lb)  Body mass index is 26.78 kg/m².    Intake/Output Summary (Last 24 hours) at 11/4/2023 1209  Last data filed at 11/4/2023 0946  Gross per 24 hour   Intake 125 ml   Output --   Net 125 ml         Physical Exam  Vitals and nursing note reviewed.   Constitutional:       General: She is not in acute distress.     Appearance: Normal appearance. She is ill-appearing.   HENT:      Head: Normocephalic and atraumatic.      Right Ear: External ear normal.      Left Ear: External ear normal.      Nose: Nose normal.      Mouth/Throat:      Mouth: Mucous membranes are dry.      Pharynx:  Oropharynx is clear.   Eyes:      General:         Right eye: No discharge.         Left eye: No discharge.      Conjunctiva/sclera: Conjunctivae normal.   Cardiovascular:      Rate and Rhythm: Normal rate and regular rhythm.      Pulses: Normal pulses.      Heart sounds: Normal heart sounds.   Pulmonary:      Effort: Pulmonary effort is normal.      Breath sounds: Normal breath sounds.   Abdominal:      General: There is distension.      Tenderness: There is abdominal tenderness. There is no guarding or rebound.      Hernia: A hernia is present.      Comments: Umbilical hernia noted   Musculoskeletal:      Cervical back: Neck supple. No tenderness.      Right lower leg: No edema.      Left lower leg: No edema.   Skin:     General: Skin is warm.   Neurological:      General: No focal deficit present.      Mental Status: She is alert and oriented to person, place, and time.   Psychiatric:         Mood and Affect: Mood normal.         Behavior: Behavior normal.         Thought Content: Thought content normal.         Judgment: Judgment normal.             Significant Labs: All pertinent labs within the past 24 hours have been reviewed.    Significant Imaging: I have reviewed all pertinent imaging results/findings within the past 24 hours.

## 2023-11-05 LAB
ALBUMIN SERPL BCP-MCNC: 1.7 G/DL (ref 3.5–5)
ANION GAP SERPL CALCULATED.3IONS-SCNC: 17 MMOL/L (ref 7–16)
BASOPHILS # BLD AUTO: 0.02 K/UL (ref 0–0.2)
BASOPHILS NFR BLD AUTO: 0.2 % (ref 0–1)
BUN SERPL-MCNC: 57 MG/DL (ref 7–18)
BUN/CREAT SERPL: 3 (ref 6–20)
CALCIUM SERPL-MCNC: 7.1 MG/DL (ref 8.5–10.1)
CD3 CELLS # BLD: 544 CELLS/MCL (ref 550–2202)
CD3 CELLS NFR BLD: 80 % (ref 58–86)
CD3+CD4+ CELLS # BLD: 196 CELLS/MCL (ref 365–1437)
CD3+CD4+ CELLS NFR BLD: 29 % (ref 32–64)
CD3+CD4+ CELLS/CD3+CD8+ CLL BLD: 0.6 %
CD3+CD8+ CELLS # BLD: 345 CELLS/MCL (ref 145–846)
CD3+CD8+ CELLS NFR BLD: 50 % (ref 13–40)
CD45 CELLS # BLD: 0.68 THOU/MCL (ref 0.82–2.84)
CHLORIDE SERPL-SCNC: 97 MMOL/L (ref 98–107)
CO2 SERPL-SCNC: 25 MMOL/L (ref 21–32)
CREAT SERPL-MCNC: 19.4 MG/DL (ref 0.55–1.02)
DIFFERENTIAL METHOD BLD: ABNORMAL
EGFR (NO RACE VARIABLE) (RUSH/TITUS): 2 ML/MIN/1.73M2
EOSINOPHIL # BLD AUTO: 0.22 K/UL (ref 0–0.5)
EOSINOPHIL NFR BLD AUTO: 2.6 % (ref 1–4)
ERYTHROCYTE [DISTWIDTH] IN BLOOD BY AUTOMATED COUNT: 16.3 % (ref 11.5–14.5)
GLUCOSE SERPL-MCNC: 71 MG/DL (ref 74–106)
HCT VFR BLD AUTO: 28.8 % (ref 38–47)
HGB BLD-MCNC: 9.2 G/DL (ref 12–16)
IMM GRANULOCYTES # BLD AUTO: 0.06 K/UL (ref 0–0.04)
IMM GRANULOCYTES NFR BLD: 0.7 % (ref 0–0.4)
LYMPHOCYTES # BLD AUTO: 0.8 K/UL (ref 1–4.8)
LYMPHOCYTES NFR BLD AUTO: 9.6 % (ref 27–41)
MCH RBC QN AUTO: 26.3 PG (ref 27–31)
MCHC RBC AUTO-ENTMCNC: 31.9 G/DL (ref 32–36)
MCV RBC AUTO: 82.3 FL (ref 80–96)
MONOCYTES # BLD AUTO: 0.51 K/UL (ref 0–0.8)
MONOCYTES NFR BLD AUTO: 6.1 % (ref 2–6)
MPC BLD CALC-MCNC: 9.6 FL (ref 9.4–12.4)
NEUTROPHILS # BLD AUTO: 6.74 K/UL (ref 1.8–7.7)
NEUTROPHILS NFR BLD AUTO: 80.8 % (ref 53–65)
NRBC # BLD AUTO: 0 X10E3/UL
NRBC, AUTO (.00): 0 %
PHOSPHATE SERPL-MCNC: 7.6 MG/DL (ref 2.5–4.5)
PLATELET # BLD AUTO: 207 K/UL (ref 150–400)
POTASSIUM SERPL-SCNC: 3.8 MMOL/L (ref 3.5–5.1)
RBC # BLD AUTO: 3.5 M/UL (ref 4.2–5.4)
SODIUM SERPL-SCNC: 135 MMOL/L (ref 136–145)
WBC # BLD AUTO: 8.35 K/UL (ref 4.5–11)

## 2023-11-05 PROCEDURE — 99232 SBSQ HOSP IP/OBS MODERATE 35: CPT | Mod: GC,,, | Performed by: STUDENT IN AN ORGANIZED HEALTH CARE EDUCATION/TRAINING PROGRAM

## 2023-11-05 PROCEDURE — 11000001 HC ACUTE MED/SURG PRIVATE ROOM

## 2023-11-05 PROCEDURE — 99232 PR SUBSEQUENT HOSPITAL CARE,LEVL II: ICD-10-PCS | Mod: GC,,, | Performed by: STUDENT IN AN ORGANIZED HEALTH CARE EDUCATION/TRAINING PROGRAM

## 2023-11-05 PROCEDURE — 25000003 PHARM REV CODE 250: Performed by: STUDENT IN AN ORGANIZED HEALTH CARE EDUCATION/TRAINING PROGRAM

## 2023-11-05 PROCEDURE — 85025 COMPLETE CBC W/AUTO DIFF WBC: CPT | Performed by: STUDENT IN AN ORGANIZED HEALTH CARE EDUCATION/TRAINING PROGRAM

## 2023-11-05 PROCEDURE — 80069 RENAL FUNCTION PANEL: CPT | Performed by: STUDENT IN AN ORGANIZED HEALTH CARE EDUCATION/TRAINING PROGRAM

## 2023-11-05 PROCEDURE — 63700000 PHARM REV CODE 250 ALT 637 W/O HCPCS: Performed by: STUDENT IN AN ORGANIZED HEALTH CARE EDUCATION/TRAINING PROGRAM

## 2023-11-05 PROCEDURE — 63600175 PHARM REV CODE 636 W HCPCS: Performed by: GENERAL PRACTICE

## 2023-11-05 RX ORDER — HEPARIN SODIUM 5000 [USP'U]/ML
5000 INJECTION, SOLUTION INTRAVENOUS; SUBCUTANEOUS EVERY 8 HOURS
Status: DISCONTINUED | OUTPATIENT
Start: 2023-11-05 | End: 2023-11-07 | Stop reason: HOSPADM

## 2023-11-05 RX ADMIN — FLUCONAZOLE 200 MG: 100 TABLET ORAL at 09:11

## 2023-11-05 RX ADMIN — HEPARIN SODIUM 5000 UNITS: 5000 INJECTION, SOLUTION INTRAVENOUS; SUBCUTANEOUS at 09:11

## 2023-11-05 RX ADMIN — ESCITALOPRAM 20 MG: 10 TABLET, FILM COATED ORAL at 09:11

## 2023-11-05 RX ADMIN — LEVOTHYROXINE SODIUM 200 MCG: 100 TABLET ORAL at 05:11

## 2023-11-05 RX ADMIN — OXYCODONE HYDROCHLORIDE 10 MG: 5 TABLET ORAL at 09:11

## 2023-11-05 RX ADMIN — FAMOTIDINE 20 MG: 20 TABLET ORAL at 09:11

## 2023-11-05 RX ADMIN — MIDODRINE HYDROCHLORIDE 10 MG: 5 TABLET ORAL at 02:11

## 2023-11-05 RX ADMIN — MIDODRINE HYDROCHLORIDE 10 MG: 5 TABLET ORAL at 05:11

## 2023-11-05 RX ADMIN — MIDODRINE HYDROCHLORIDE 10 MG: 5 TABLET ORAL at 09:11

## 2023-11-05 NOTE — ASSESSMENT & PLAN NOTE
- Continue levaquin  - CBC and renal panel in AM  - Tylenol and pain medication PRN  - Monitor CBC daily    11/4  Peritonitis fungal as confirmed by nephrology last week  Fluconazole 200mg daily     11/5  Continue Diflucan

## 2023-11-05 NOTE — ASSESSMENT & PLAN NOTE
This patient does have evidence of infective focus  My overall impression is sepsis.  Source: Abdominal  Antibiotics given-   Antibiotics (72h ago, onward)    None        Latest lactate reviewed-  Recent Labs   Lab 11/03/23  0538   LACTATE 0.4     Organ dysfunction indicated by Acute liver injury    Fluid challenge Other- Patient to receive 250 ml volume other than 30cc/kg due to End Stage Renal Disease     Post- resuscitation assessment Yes Perfusion exam was performed within 6 hours of septic shock presentation after bolus shows Adequate tissue perfusion assessed by non-invasive monitoring       Will Not start Pressors- Levophed for MAP of 65  Source control achieved by: antibiotics    11/4  Bcx ng at 24 hours   Continue fluconazole for fungal peritonitis     11/5  Resolved  Bcx ng at 48 hours   Continue fluconazole  Possible discharge tomorrow after dialysis

## 2023-11-05 NOTE — SUBJECTIVE & OBJECTIVE
Interval History: Patient was seen and evaluated during AM rounds lying in bed in [facility]. No overnight events. Nephrology planning for HD tomorrow. Per nephrology she will be ok to be discharged after dialysis if a spot is secured in [facility] for outpatient HD.    Review of Systems   Constitutional:  Negative for chills, diaphoresis, fatigue and fever.   HENT:  Negative for sinus pressure, sinus pain, sore throat and tinnitus.    Eyes:  Negative for visual disturbance.   Respiratory:  Negative for cough, chest tightness and shortness of breath.    Cardiovascular:  Negative for chest pain, palpitations and leg swelling.   Gastrointestinal:  Positive for abdominal distention and abdominal pain. Negative for anal bleeding, nausea and vomiting.   Genitourinary:  Negative for hematuria.   Musculoskeletal:  Negative for neck pain and neck stiffness.   Skin:  Negative for wound.   Neurological:  Negative for tremors, seizures, syncope and speech difficulty.   Psychiatric/Behavioral:  Negative for agitation, behavioral problems and confusion.      Objective:     Vital Signs (Most Recent):  Temp: 98.2 °F (36.8 °C) (11/05/23 1000)  Pulse: 66 (11/05/23 1000)  Resp: 18 (11/05/23 1000)  BP: 105/67 (11/05/23 1000)  SpO2: (!) 93 % (11/05/23 1000) Vital Signs (24h Range):  Temp:  [98.1 °F (36.7 °C)-99.2 °F (37.3 °C)] 98.2 °F (36.8 °C)  Pulse:  [65-92] 66  Resp:  [17-18] 18  SpO2:  [93 %-98 %] 93 %  BP: ()/(44-67) 105/67     Weight: 77.6 kg (171 lb)  Body mass index is 26.78 kg/m².  No intake or output data in the 24 hours ending 11/05/23 1509      Physical Exam  Vitals and nursing note reviewed.   Constitutional:       General: She is not in acute distress.     Appearance: Normal appearance. She is ill-appearing.   HENT:      Head: Normocephalic and atraumatic.      Right Ear: External ear normal.      Left Ear: External ear normal.      Nose: Nose normal.      Mouth/Throat:      Mouth: Mucous membranes are dry.      Pharynx:  Oropharynx is clear.   Eyes:      General:         Right eye: No discharge.         Left eye: No discharge.      Conjunctiva/sclera: Conjunctivae normal.   Cardiovascular:      Rate and Rhythm: Normal rate and regular rhythm.      Pulses: Normal pulses.      Heart sounds: Normal heart sounds.   Pulmonary:      Effort: Pulmonary effort is normal.      Breath sounds: Normal breath sounds.   Abdominal:      General: There is distension.      Tenderness: There is abdominal tenderness. There is no guarding or rebound.      Hernia: A hernia is present.      Comments: Umbilical hernia noted   Musculoskeletal:      Cervical back: Neck supple. No tenderness.      Right lower leg: No edema.      Left lower leg: No edema.   Skin:     General: Skin is warm.   Neurological:      General: No focal deficit present.      Mental Status: She is alert and oriented to person, place, and time.   Psychiatric:         Mood and Affect: Mood normal.         Behavior: Behavior normal.         Thought Content: Thought content normal.         Judgment: Judgment normal.             Significant Labs: All pertinent labs within the past 24 hours have been reviewed.  Recent Lab Results         11/05/23  0510        Albumin 1.7       Anion Gap 17       Baso # 0.02       Basophil % 0.2       BUN 57       BUN/CREAT RATIO 3       Calcium 7.1       Chloride 97       CO2 25       Creatinine 19.40       Differential Method Auto       eGFR 2       Eos # 0.22       Eosinophil % 2.6       Glucose 71       Hematocrit 28.8       Hemoglobin 9.2       Immature Grans (Abs) 0.06       Immature Granulocytes 0.7       Lymph # 0.80       Lymph % 9.6       MCH 26.3       MCHC 31.9       MCV 82.3       Mono # 0.51       Mono % 6.1       MPV 9.6       Neutrophils, Abs 6.74       Neutrophils Relative 80.8       nRBC 0.0       NUCLEATED RBC ABSOLUTE 0.00       Phosphorus Level 7.6       Platelet Count 207       Potassium 3.8       RBC 3.50       RDW 16.3       Sodium  135       WBC 8.35               Significant Imaging: I have reviewed all pertinent imaging results/findings within the past 24 hours.  I have reviewed and interpreted all pertinent imaging results/findings within the past 24 hours.

## 2023-11-05 NOTE — ASSESSMENT & PLAN NOTE
Continue home midodrine and monitor blood pressure    11/4  Latest /66  Continue midodrine    11/5  Improving with midodrine

## 2023-11-05 NOTE — NURSING
Patient is requesting something for pain. Oxycodone is listed as an allergy, but was given to the patient yesterday on day shift. Asked patient if she was allergic to oxycodone, states she is not. Asked if she had any issues yesterday, states she did not. Administered oxycodone per order. Educated patient on s/s of allergic reaction and asked that she report any discomfort to me. V/u.

## 2023-11-05 NOTE — PROGRESS NOTES
[facility] Medicine  Progress Note    Patient Name: Marian Tabares  MRN: [id number]  Patient Class: IP- Inpatient   Admission Date: 11/2/2023  Length of Stay: 2 days  Attending Physician: Aden Cornejo DO  Primary Care Provider: Darlene, Primary Doctor        Subjective:     Principal Problem:Severe sepsis        HPI:  Patient is a 44 yo female who presents to the hospital with a complaint of abdominal pain and hypotension. Patient has a history of ESRD since 2007 due to  HIV and is on home PD every night. She described that pain as a diffuse abdominal pain more pronounced at the periumbilical region that started 14 days ago but worsened over the past few days. Associated symptoms include chills, nausea, vomiting and bloating, but denies fever, chest pain, palpitations, and shortness of breath. She was seen by her PCP who told her that she could have a fungal infection and was thus started on fungal infection without improvement of her symptoms, which culminated in this ER visit today. Of note, she was admitted at [facility] in May 2022 for abdominal pain and hypotension due to recurrent peritonitis, but they were unable to identify the organism and infectious disease recommended empiric antibiotics through vancomycin and Cefepime for three weeks. Prior to that, she was admitted to ICU at [facility] for syncope and septic shock due to MSSA peritonitis. Patient noted that the pain is similar to the one she had during her previous episodes of peritonitis.     At the emergency department, the patient was afebrile and vital signs with /54, Pulse 87, RR 18 and % at room air on arrival and then about four hours later she become hypotensive with BP of 89/51 and tachypneic with RR 22 meeting sepsis while in the emergency department. She was already started on antibiotics prior to meeting sepsis criteria. Nevertheless, she was treated per sepsis protocol with gentle hydration due to ESRD along  with blood collection for culture and lactic acid level. Ensuring work up revealed paracentesis suggestive of peritonitis with cell count excluding RBCs of 3589 and Polys 72. CBC was normal with WBC 8.8, H&H 11.1/36.4 and . CMP was significant for Na 136, hypokalemia with K 2.7, BUN/CR 36/15 with baseline CR of 13.3 and GFR 3. Mg of 1.1. Patient will be admitted for further evaluation and management.               Interval History: Patient was seen and evaluated during AM rounds lying in bed in NAD. No overnight events. Nephrology planning for HD tomorrow. Per nephrology she will be ok to be discharged after dialysis if a spot is secured in [facility] for outpatient HD.    Review of Systems   Constitutional:  Negative for chills, diaphoresis, fatigue and fever.   HENT:  Negative for sinus pressure, sinus pain, sore throat and tinnitus.    Eyes:  Negative for visual disturbance.   Respiratory:  Negative for cough, chest tightness and shortness of breath.    Cardiovascular:  Negative for chest pain, palpitations and leg swelling.   Gastrointestinal:  Positive for abdominal distention and abdominal pain. Negative for anal bleeding, nausea and vomiting.   Genitourinary:  Negative for hematuria.   Musculoskeletal:  Negative for neck pain and neck stiffness.   Skin:  Negative for wound.   Neurological:  Negative for tremors, seizures, syncope and speech difficulty.   Psychiatric/Behavioral:  Negative for agitation, behavioral problems and confusion.      Objective:     Vital Signs (Most Recent):  Temp: 98.2 °F (36.8 °C) (11/05/23 1000)  Pulse: 66 (11/05/23 1000)  Resp: 18 (11/05/23 1000)  BP: 105/67 (11/05/23 1000)  SpO2: (!) 93 % (11/05/23 1000) Vital Signs (24h Range):  Temp:  [98.1 °F (36.7 °C)-99.2 °F (37.3 °C)] 98.2 °F (36.8 °C)  Pulse:  [65-92] 66  Resp:  [17-18] 18  SpO2:  [93 %-98 %] 93 %  BP: ()/(44-67) 105/67     Weight: 77.6 kg (171 lb)  Body mass index is 26.78 kg/m².  No intake or output data in the 24  hours ending 11/05/23 1509      Physical Exam  Vitals and nursing note reviewed.   Constitutional:       General: She is not in acute distress.     Appearance: Normal appearance. She is ill-appearing.   HENT:      Head: Normocephalic and atraumatic.      Right Ear: External ear normal.      Left Ear: External ear normal.      Nose: Nose normal.      Mouth/Throat:      Mouth: Mucous membranes are dry.      Pharynx: Oropharynx is clear.   Eyes:      General:         Right eye: No discharge.         Left eye: No discharge.      Conjunctiva/sclera: Conjunctivae normal.   Cardiovascular:      Rate and Rhythm: Normal rate and regular rhythm.      Pulses: Normal pulses.      Heart sounds: Normal heart sounds.   Pulmonary:      Effort: Pulmonary effort is normal.      Breath sounds: Normal breath sounds.   Abdominal:      General: There is distension.      Tenderness: There is abdominal tenderness. There is no guarding or rebound.      Hernia: A hernia is present.      Comments: Umbilical hernia noted   Musculoskeletal:      Cervical back: Neck supple. No tenderness.      Right lower leg: No edema.      Left lower leg: No edema.   Skin:     General: Skin is warm.   Neurological:      General: No focal deficit present.      Mental Status: She is alert and oriented to person, place, and time.   Psychiatric:         Mood and Affect: Mood normal.         Behavior: Behavior normal.         Thought Content: Thought content normal.         Judgment: Judgment normal.             Significant Labs: All pertinent labs within the past 24 hours have been reviewed.  Recent Lab Results         11/05/23  0510        Albumin 1.7       Anion Gap 17       Baso # 0.02       Basophil % 0.2       BUN 57       BUN/CREAT RATIO 3       Calcium 7.1       Chloride 97       CO2 25       Creatinine 19.40       Differential Method Auto       eGFR 2       Eos # 0.22       Eosinophil % 2.6       Glucose 71       Hematocrit 28.8       Hemoglobin 9.2        Immature Grans (Abs) 0.06       Immature Granulocytes 0.7       Lymph # 0.80       Lymph % 9.6       MCH 26.3       MCHC 31.9       MCV 82.3       Mono # 0.51       Mono % 6.1       MPV 9.6       Neutrophils, Abs 6.74       Neutrophils Relative 80.8       nRBC 0.0       NUCLEATED RBC ABSOLUTE 0.00       Phosphorus Level 7.6       Platelet Count 207       Potassium 3.8       RBC 3.50       RDW 16.3       Sodium 135       WBC 8.35               Significant Imaging: I have reviewed all pertinent imaging results/findings within the past 24 hours.  I have reviewed and interpreted all pertinent imaging results/findings within the past 24 hours.      Assessment/Plan:      * Severe sepsis  This patient does have evidence of infective focus  My overall impression is sepsis.  Source: Abdominal  Antibiotics given-   Antibiotics (72h ago, onward)    None        Latest lactate reviewed-  Recent Labs   Lab 11/03/23  0538   LACTATE 0.4     Organ dysfunction indicated by Acute liver injury    Fluid challenge Other- Patient to receive 250 ml volume other than 30cc/kg due to End Stage Renal Disease     Post- resuscitation assessment Yes Perfusion exam was performed within 6 hours of septic shock presentation after bolus shows Adequate tissue perfusion assessed by non-invasive monitoring       Will Not start Pressors- Levophed for MAP of 65  Source control achieved by: antibiotics    11/4  Bcx ng at 24 hours   Continue fluconazole for fungal peritonitis     11/5  Resolved  Bcx ng at 48 hours   Continue fluconazole  Possible discharge tomorrow after dialysis      Hypotension  Continue home midodrine and monitor blood pressure    11/4  Latest /66  Continue midodrine    11/5  Improving with midodrine    Depression  Patient has persistent depression which is moderate and is currently controlled. Will Continue anti-depressant medications. We will not consult psychiatry at this time. Patient does not display psychosis at this time.  Continue to monitor closely and adjust plan of care as needed.        Electrolyte abnormality  - Mg 1.1 and K 2.7  - Replacing Mg and recheck lab in lab  - Replacing K but patient is ESRD on PD  - Nephrology consulted for dialysis, thanks for the assistance    11/4  Plan for hemodialysis Monday  Potassium wnl      Postoperative hypothyroidism  Patient with history of Grave Disease post thyroidectomy  Pending TSH  Adjust the dose of home levothyroxine as indicated     11/4    Increase synthroid to 200mcg daily    GERD (gastroesophageal reflux disease)  Continue home pepcid      ESRD (end stage renal disease)  - BUN/CR 36/15  - Avoid nephrotoxic drugs  - Renal dose applicable medication  - Strict I and O  - Monitor renal function daily  - Nephrology consulted for dialysis, thanks for the assistance    11/4  Peritoneal catheter replaced by HD catheter in OR  Plan for hemodialysis Monday   Crt 17.4     11/5  Nephrology planning for HD tomorrow. Per nephrology she will be ok to be discharged after dialysis if a spot is secured in [facility] for outpatient HD.    Human immunodeficiency virus (HIV) disease  Continue home medication while at the hospital    11/4  Continue HIV medications     Peritonitis  - Continue levaquin  - CBC and renal panel in AM  - Tylenol and pain medication PRN  - Monitor CBC daily    11/4  Peritonitis fungal as confirmed by nephrology last week  Fluconazole 200mg daily     11/5  Continue Diflucan      VTE Risk Mitigation (From admission, onward)         Ordered     Place sequential compression device  Until discontinued         11/03/23 2129                Discharge Planning   AYUSH:      Code Status: Full Code   Is the patient medically ready for discharge?:     Reason for patient still in hospital (select all that apply): Treatment  Discharge Plan A: Home            Charlie Espinosa MD  Department of Hospital Medicine   [facility]

## 2023-11-05 NOTE — ASSESSMENT & PLAN NOTE
- BUN/CR 36/15  - Avoid nephrotoxic drugs  - Renal dose applicable medication  - Strict I and O  - Monitor renal function daily  - Nephrology consulted for dialysis, thanks for the assistance    11/4  Peritoneal catheter replaced by HD catheter in OR  Plan for hemodialysis Monday   Crt 17.4     11/5  Nephrology planning for HD tomorrow. Per nephrology she will be ok to be discharged after dialysis if a spot is secured in [facility] for outpatient HD.

## 2023-11-05 NOTE — NURSING
Patient took home medications for HIV. Took pifeltro 100 mg and ivicay 50 mg. Medications are in room with patient

## 2023-11-05 NOTE — ASSESSMENT & PLAN NOTE
- Mg 1.1 and K 2.7  - Replacing Mg and recheck lab in lab  - Replacing K but patient is ESRD on PD  - Nephrology consulted for dialysis, thanks for the assistance    11/4  Plan for hemodialysis Monday  Potassium wnl    11/5  Potassium wnl  Monitor renal panel daily    11/6  K wnl  Corrected calcium 8.6/uncorrected 6.9  Phosphorous 8.7  Nephrology following

## 2023-11-05 NOTE — PROGRESS NOTES
[facility]  Nephrology  Progress Note    Patient Name: Marian Tabares  MRN: [id number]  Admission Date: 11/2/2023  Hospital Length of Stay: 2 days  Attending Provider: Aden Cornejo DO   Primary Care Physician: Darlene, Primary Doctor  Principal Problem:Severe sepsis    Consults  Subjective:     Interval History: F/U ESRD with fungal peritonitis. P cath removed yesterday. Hemo cath placed right groin. Feels well with very little abd discomfort today.    Review of patient's allergies indicates:   Allergen Reactions    Ceftazidime Itching    Iron sucrose Itching    Sodium ferric gluconat-sucrose Itching    Cefazolin Itching    Gentamicin Itching     Not allergic     Iron Itching    Soap Hives    Sodium ferric gluconate complex Itching    Sucrose Itching    Adhesive Hives and Rash     No surgical tape    Povidone-iodine Itching, Rash and Hives     Burning to skin  blisters       Current Facility-Administered Medications   Medication Frequency    acetaminophen tablet 1,000 mg Q6H PRN    bisacodyL EC tablet 10 mg Daily PRN    dextromethorphan-guaiFENesin  mg/5 ml liquid 10 mL Q6H PRN    dolutegravir Tab 50 mg Daily    doravirine Tab 100 mg Daily    EScitalopram oxalate tablet 20 mg Daily    famotidine tablet 20 mg QHS    fluconazole tablet 200 mg Daily    levothyroxine tablet 200 mcg Before breakfast    midodrine tablet 10 mg Q8H    morphine injection 4 mg Q4H PRN    ondansetron injection 8 mg Q6H PRN    oxyCODONE immediate release tablet 10 mg Q4H PRN    simethicone chewable tablet 80 mg TID PRN    traZODone tablet 50 mg Nightly PRN       Objective:     Vital Signs (Most Recent):  Temp: 98.2 °F (36.8 °C) (11/05/23 0600)  Pulse: 65 (11/05/23 0600)  Resp: 18 (11/05/23 0600)  BP: (!) 100/59 (11/05/23 0600)  SpO2: (!) 94 % (11/05/23 0600) Vital Signs (24h Range):  Temp:  [98.1 °F (36.7 °C)-99.2 °F (37.3 °C)] 98.2 °F (36.8 °C)  Pulse:  [] 65  Resp:  [16-18] 18  SpO2:  [94 %-98 %] 94 %  BP:  ()/(44-66) 100/59     Weight: 77.6 kg (171 lb) (11/03/23 1332)  Body mass index is 26.78 kg/m².  Body surface area is 1.92 meters squared.    I/O last 3 completed shifts:  In: 125 [IV Piggyback:125]  Out: -     Physical Exam No abd pain other than at op site. Chest clear.    Significant Labs:sureBMP:   Recent Labs   Lab 11/03/23  0508 11/04/23  0411 11/05/23  0510   GLU  --    < > 71*   NA  --    < > 135*   K 2.8*   < > 3.8   CL  --    < > 97*   CO2  --    < > 25   BUN  --    < > 57*   CREATININE  --    < > 19.40*   CALCIUM  --    < > 7.1*   MG 1.3*  --   --     < > = values in this interval not displayed.     CBC:   Recent Labs   Lab 11/05/23  0510   WBC 8.35   RBC 3.50*   HGB 9.2*   HCT 28.8*      MCV 82.3   MCH 26.3*   MCHC 31.9*     All labs within the past 24 hours have been reviewed.    Significant Imaging:  Labs: Reviewed    Assessment/Plan:     Active Diagnoses:    Diagnosis Date Noted POA    PRINCIPAL PROBLEM:  Severe sepsis [A41.9, R65.20] 11/03/2023 Yes    Peritonitis [K65.9] 11/03/2023 Yes    GERD (gastroesophageal reflux disease) [K21.9] 11/03/2023 Yes    Postoperative hypothyroidism [E89.0] 11/03/2023 Yes    Electrolyte abnormality [E87.8] 11/03/2023 Yes    Depression [F32.A] 11/03/2023 Yes    Hypotension [I95.9] 11/03/2023 Yes    Human immunodeficiency virus (HIV) disease [B20]  Yes    ESRD (end stage renal disease) [N18.6]  Yes      Problems Resolved During this Admission:       Hemodialysis in AM. Arrange outpatient hemodialysis. Should be able to go home after hemodialysis if she has a spot for outpatient dialysis  in the [place] unit. Continuing Diflucan    Thank you for your consult. I will follow-up with patient. Please contact us if you have any additional questions.    Zachary Lemos MD  Nephrology  [facility]

## 2023-11-05 NOTE — PROGRESS NOTES
Stable, no acute events overnight.  Pain controlled.      Dressings the abdomen clean dry and intact.      To dialysis tomorrow; nephrology following

## 2023-11-05 NOTE — PLAN OF CARE
Problem: Adjustment to Illness (Sepsis/Septic Shock)  Goal: Optimal Coping  Outcome: Ongoing, Progressing  Intervention: Optimize Psychosocial Adjustment to Illness  Flowsheets (Taken 11/4/2023 2332)  Supportive Measures:   active listening utilized   self-care encouraged     Problem: Bleeding (Sepsis/Septic Shock)  Goal: Absence of Bleeding  Outcome: Ongoing, Progressing  Intervention: Monitor and Manage Bleeding  Flowsheets (Taken 11/4/2023 2332)  Bleeding Precautions:   blood pressure closely monitored   monitored for signs of bleeding     Problem: Infection Progression (Sepsis/Septic Shock)  Goal: Absence of Infection Signs and Symptoms  Outcome: Ongoing, Progressing  Intervention: Initiate Sepsis Management  Flowsheets (Taken 11/4/2023 2332)  Infection Prevention:   cohorting utilized   hand hygiene promoted   single patient room provided   rest/sleep promoted  Infection Management: aseptic technique maintained     Problem: Fall Injury Risk  Goal: Absence of Fall and Fall-Related Injury  Outcome: Ongoing, Progressing  Intervention: Identify and Manage Contributors  Flowsheets (Taken 11/4/2023 2332)  Self-Care Promotion:   independence encouraged   BADL personal objects within reach  Medication Review/Management: medications reviewed     Problem: Pain Acute  Goal: Acceptable Pain Control and Functional Ability  Outcome: Ongoing, Progressing  Intervention: Develop Pain Management Plan  Flowsheets (Taken 11/4/2023 2332)  Pain Management Interventions: medication offered  Intervention: Prevent or Manage Pain  Flowsheets (Taken 11/4/2023 2332)  Sleep/Rest Enhancement:   awakenings minimized   regular sleep/rest pattern promoted  Sensory Stimulation Regulation: quiet environment promoted  Bowel Elimination Promotion:   adequate fluid intake promoted   ambulation promoted  Medication Review/Management: medications reviewed  Intervention: Optimize Psychosocial Wellbeing  Flowsheets (Taken 11/4/2023 2332)  Supportive  Measures:   active listening utilized   self-care encouraged     Problem: Fatigue  Goal: Improved Activity Tolerance  Outcome: Ongoing, Progressing  Intervention: Promote Improved Energy  Flowsheets (Taken 11/4/2023 2332)  Sleep/Rest Enhancement:   awakenings minimized   regular sleep/rest pattern promoted     Problem: Infection  Goal: Absence of Infection Signs and Symptoms  Outcome: Ongoing, Progressing  Intervention: Prevent or Manage Infection  Flowsheets (Taken 11/4/2023 2332)  Infection Management: aseptic technique maintained

## 2023-11-06 PROBLEM — B37.0 ORAL THRUSH: Status: ACTIVE | Noted: 2023-11-06

## 2023-11-06 LAB
ALBUMIN SERPL BCP-MCNC: 1.9 G/DL (ref 3.5–5)
ANION GAP SERPL CALCULATED.3IONS-SCNC: 16 MMOL/L (ref 7–16)
BASOPHILS # BLD AUTO: 0.03 K/UL (ref 0–0.2)
BASOPHILS NFR BLD AUTO: 0.4 % (ref 0–1)
BUN SERPL-MCNC: 64 MG/DL (ref 7–18)
BUN/CREAT SERPL: 3 (ref 6–20)
CALCIUM SERPL-MCNC: 6.9 MG/DL (ref 8.5–10.1)
CHLORIDE SERPL-SCNC: 98 MMOL/L (ref 98–107)
CO2 SERPL-SCNC: 26 MMOL/L (ref 21–32)
CREAT SERPL-MCNC: 21.7 MG/DL (ref 0.55–1.02)
DIFFERENTIAL METHOD BLD: ABNORMAL
EGFR (NO RACE VARIABLE) (RUSH/TITUS): 2 ML/MIN/1.73M2
EOSINOPHIL # BLD AUTO: 0.3 K/UL (ref 0–0.5)
EOSINOPHIL NFR BLD AUTO: 4.5 % (ref 1–4)
ERYTHROCYTE [DISTWIDTH] IN BLOOD BY AUTOMATED COUNT: 16.7 % (ref 11.5–14.5)
GLUCOSE SERPL-MCNC: 85 MG/DL (ref 74–106)
HAV IGM SER QL: NORMAL
HBV CORE IGM SER QL: NORMAL
HBV SURFACE AG SERPL QL IA: NORMAL
HCT VFR BLD AUTO: 31.1 % (ref 38–47)
HCV AB SER QL: NORMAL
HGB BLD-MCNC: 9.7 G/DL (ref 12–16)
IMM GRANULOCYTES # BLD AUTO: 0.09 K/UL (ref 0–0.04)
IMM GRANULOCYTES NFR BLD: 1.3 % (ref 0–0.4)
INSULIN SERPL-ACNC: NORMAL U[IU]/ML
LAB AP COMMENTS: NORMAL
LAB AP GROSS DESCRIPTION: NORMAL
LAB AP LABORATORY NOTES: NORMAL
LAB AP SPECIMEN A NON-GYN GENERAL CATEGORIZATION: NEGATIVE
LAB AP SPECIMEN A NON-GYN INTERPETATION: NORMAL
LYMPHOCYTES # BLD AUTO: 1.03 K/UL (ref 1–4.8)
LYMPHOCYTES NFR BLD AUTO: 15.4 % (ref 27–41)
MCH RBC QN AUTO: 26.4 PG (ref 27–31)
MCHC RBC AUTO-ENTMCNC: 31.2 G/DL (ref 32–36)
MCV RBC AUTO: 84.5 FL (ref 80–96)
MONOCYTES # BLD AUTO: 0.46 K/UL (ref 0–0.8)
MONOCYTES NFR BLD AUTO: 6.9 % (ref 2–6)
MPC BLD CALC-MCNC: 9.2 FL (ref 9.4–12.4)
NEUTROPHILS # BLD AUTO: 4.78 K/UL (ref 1.8–7.7)
NEUTROPHILS NFR BLD AUTO: 71.5 % (ref 53–65)
NRBC # BLD AUTO: 0 X10E3/UL
NRBC, AUTO (.00): 0 %
PHOSPHATE SERPL-MCNC: 8.7 MG/DL (ref 2.5–4.5)
PLATELET # BLD AUTO: 206 K/UL (ref 150–400)
POTASSIUM SERPL-SCNC: 3.7 MMOL/L (ref 3.5–5.1)
RBC # BLD AUTO: 3.68 M/UL (ref 4.2–5.4)
SODIUM SERPL-SCNC: 136 MMOL/L (ref 136–145)
WBC # BLD AUTO: 6.69 K/UL (ref 4.5–11)

## 2023-11-06 PROCEDURE — 85025 COMPLETE CBC W/AUTO DIFF WBC: CPT | Performed by: STUDENT IN AN ORGANIZED HEALTH CARE EDUCATION/TRAINING PROGRAM

## 2023-11-06 PROCEDURE — 11000001 HC ACUTE MED/SURG PRIVATE ROOM

## 2023-11-06 PROCEDURE — 25000003 PHARM REV CODE 250: Performed by: FAMILY MEDICINE

## 2023-11-06 PROCEDURE — 25000003 PHARM REV CODE 250: Performed by: STUDENT IN AN ORGANIZED HEALTH CARE EDUCATION/TRAINING PROGRAM

## 2023-11-06 PROCEDURE — 63600175 PHARM REV CODE 636 W HCPCS: Performed by: GENERAL PRACTICE

## 2023-11-06 PROCEDURE — 80074 ACUTE HEPATITIS PANEL: CPT | Performed by: INTERNAL MEDICINE

## 2023-11-06 PROCEDURE — 90935 HEMODIALYSIS ONE EVALUATION: CPT

## 2023-11-06 PROCEDURE — 63600175 PHARM REV CODE 636 W HCPCS: Performed by: INTERNAL MEDICINE

## 2023-11-06 PROCEDURE — 99232 SBSQ HOSP IP/OBS MODERATE 35: CPT | Mod: GC,,, | Performed by: FAMILY MEDICINE

## 2023-11-06 PROCEDURE — 25000003 PHARM REV CODE 250: Performed by: INTERNAL MEDICINE

## 2023-11-06 PROCEDURE — 80069 RENAL FUNCTION PANEL: CPT | Performed by: STUDENT IN AN ORGANIZED HEALTH CARE EDUCATION/TRAINING PROGRAM

## 2023-11-06 PROCEDURE — 99232 PR SUBSEQUENT HOSPITAL CARE,LEVL II: ICD-10-PCS | Mod: GC,,, | Performed by: FAMILY MEDICINE

## 2023-11-06 PROCEDURE — 63700000 PHARM REV CODE 250 ALT 637 W/O HCPCS: Performed by: STUDENT IN AN ORGANIZED HEALTH CARE EDUCATION/TRAINING PROGRAM

## 2023-11-06 PROCEDURE — 99900035 HC TECH TIME PER 15 MIN (STAT)

## 2023-11-06 PROCEDURE — 94761 N-INVAS EAR/PLS OXIMETRY MLT: CPT

## 2023-11-06 PROCEDURE — 25000003 PHARM REV CODE 250

## 2023-11-06 RX ORDER — MUPIROCIN 20 MG/G
OINTMENT TOPICAL 2 TIMES DAILY
Status: DISCONTINUED | OUTPATIENT
Start: 2023-11-06 | End: 2023-11-07 | Stop reason: HOSPADM

## 2023-11-06 RX ORDER — HEPARIN SODIUM 1000 [USP'U]/ML
4000 INJECTION, SOLUTION INTRAVENOUS; SUBCUTANEOUS
Status: DISCONTINUED | OUTPATIENT
Start: 2023-11-06 | End: 2023-11-07 | Stop reason: HOSPADM

## 2023-11-06 RX ORDER — SODIUM CHLORIDE 9 MG/ML
INJECTION, SOLUTION INTRAVENOUS
Status: DISCONTINUED | OUTPATIENT
Start: 2023-11-06 | End: 2023-11-07 | Stop reason: HOSPADM

## 2023-11-06 RX ORDER — NYSTATIN 100000 [USP'U]/ML
500000 SUSPENSION ORAL
Status: DISCONTINUED | OUTPATIENT
Start: 2023-11-06 | End: 2023-11-07 | Stop reason: HOSPADM

## 2023-11-06 RX ORDER — DAPSONE 25 MG/1
100 TABLET ORAL DAILY
Status: DISCONTINUED | OUTPATIENT
Start: 2023-11-06 | End: 2023-11-07 | Stop reason: HOSPADM

## 2023-11-06 RX ADMIN — ALTEPLASE 2 MG: 2.2 INJECTION, POWDER, LYOPHILIZED, FOR SOLUTION INTRAVENOUS at 11:11

## 2023-11-06 RX ADMIN — FAMOTIDINE 20 MG: 20 TABLET ORAL at 09:11

## 2023-11-06 RX ADMIN — LEVOTHYROXINE SODIUM 200 MCG: 100 TABLET ORAL at 06:11

## 2023-11-06 RX ADMIN — HEPARIN SODIUM 5000 UNITS: 5000 INJECTION, SOLUTION INTRAVENOUS; SUBCUTANEOUS at 09:11

## 2023-11-06 RX ADMIN — DAPSONE 100 MG: 25 TABLET ORAL at 03:11

## 2023-11-06 RX ADMIN — MIDODRINE HYDROCHLORIDE 10 MG: 5 TABLET ORAL at 06:11

## 2023-11-06 RX ADMIN — NYSTATIN 500000 UNITS: 100000 SUSPENSION ORAL at 09:11

## 2023-11-06 RX ADMIN — FLUCONAZOLE 200 MG: 100 TABLET ORAL at 08:11

## 2023-11-06 RX ADMIN — ESCITALOPRAM 20 MG: 10 TABLET, FILM COATED ORAL at 08:11

## 2023-11-06 RX ADMIN — MIDODRINE HYDROCHLORIDE 10 MG: 5 TABLET ORAL at 09:11

## 2023-11-06 RX ADMIN — NYSTATIN 500000 UNITS: 100000 SUSPENSION ORAL at 08:11

## 2023-11-06 RX ADMIN — MUPIROCIN: 20 OINTMENT TOPICAL at 09:11

## 2023-11-06 RX ADMIN — HEPARIN SODIUM 5000 UNITS: 5000 INJECTION, SOLUTION INTRAVENOUS; SUBCUTANEOUS at 06:11

## 2023-11-06 RX ADMIN — MIDODRINE HYDROCHLORIDE 10 MG: 5 TABLET ORAL at 03:11

## 2023-11-06 RX ADMIN — HEPARIN SODIUM 4000 UNITS: 1000 INJECTION, SOLUTION INTRAVENOUS; SUBCUTANEOUS at 12:11

## 2023-11-06 RX ADMIN — OXYCODONE HYDROCHLORIDE 10 MG: 5 TABLET ORAL at 09:11

## 2023-11-06 RX ADMIN — NYSTATIN 500000 UNITS: 100000 SUSPENSION ORAL at 05:11

## 2023-11-06 RX ADMIN — SODIUM CHLORIDE: 9 INJECTION, SOLUTION INTRAVENOUS at 11:11

## 2023-11-06 NOTE — ASSESSMENT & PLAN NOTE
Continue home medication while at the hospital    11/4  Continue HIV medications     11/6  CD4 count (T cells) 196 - will start prophylactic dapsone as patient has bactrim allergy

## 2023-11-06 NOTE — SUBJECTIVE & OBJECTIVE
Interval History: Patient due to get HD this morning. Pending schedule to get HD outpatient. Start nystatin for oral thrush.     Review of Systems   Constitutional:  Negative for chills, diaphoresis, fatigue and fever.   HENT:  Positive for sore throat. Negative for sinus pressure, sinus pain and tinnitus.    Eyes:  Negative for visual disturbance.   Respiratory:  Negative for cough, chest tightness and shortness of breath.    Cardiovascular:  Negative for chest pain, palpitations and leg swelling.   Gastrointestinal:  Positive for abdominal distention and abdominal pain. Negative for anal bleeding, nausea and vomiting.   Genitourinary:  Negative for hematuria.   Musculoskeletal:  Negative for neck pain and neck stiffness.   Skin:  Negative for wound.   Neurological:  Negative for tremors, seizures, syncope and speech difficulty.   Psychiatric/Behavioral:  Negative for agitation, behavioral problems and confusion.      Objective:     Vital Signs (Most Recent):  Temp: 97.9 °F (36.6 °C) (11/06/23 0644)  Pulse: 64 (11/06/23 0644)  Resp: 18 (11/06/23 0644)  BP: 100/66 (11/06/23 0644)  SpO2: 96 % (11/06/23 0644) Vital Signs (24h Range):  Temp:  [97.9 °F (36.6 °C)-98.6 °F (37 °C)] 97.9 °F (36.6 °C)  Pulse:  [61-68] 64  Resp:  [18] 18  SpO2:  [93 %-97 %] 96 %  BP: ()/(60-71) 100/66     Weight: 77.6 kg (171 lb)  Body mass index is 26.78 kg/m².  No intake or output data in the 24 hours ending 11/06/23 0953      Physical Exam  Vitals and nursing note reviewed.   Constitutional:       General: She is not in acute distress.     Appearance: Normal appearance. She is ill-appearing.   HENT:      Head: Normocephalic and atraumatic.      Right Ear: External ear normal.      Left Ear: External ear normal.      Nose: Nose normal.      Mouth/Throat:      Mouth: Mucous membranes are dry.      Pharynx: Oropharynx is clear.      Comments: Oral thrush  Eyes:      General:         Right eye: No discharge.         Left eye: No  discharge.      Conjunctiva/sclera: Conjunctivae normal.   Cardiovascular:      Rate and Rhythm: Normal rate and regular rhythm.      Pulses: Normal pulses.      Heart sounds: Normal heart sounds.   Pulmonary:      Effort: Pulmonary effort is normal.      Breath sounds: Normal breath sounds.   Abdominal:      General: There is distension.      Tenderness: There is abdominal tenderness. There is no guarding or rebound.      Hernia: A hernia is present.      Comments: Umbilical hernia noted   Musculoskeletal:      Cervical back: Neck supple. No tenderness.      Right lower leg: No edema.      Left lower leg: No edema.   Skin:     General: Skin is warm.   Neurological:      General: No focal deficit present.      Mental Status: She is alert and oriented to person, place, and time.   Psychiatric:         Mood and Affect: Mood normal.         Behavior: Behavior normal.         Thought Content: Thought content normal.         Judgment: Judgment normal.             Significant Labs: All pertinent labs within the past 24 hours have been reviewed.    Significant Imaging: I have reviewed all pertinent imaging results/findings within the past 24 hours.

## 2023-11-06 NOTE — ASSESSMENT & PLAN NOTE
Continue home midodrine and monitor blood pressure    11/4  Latest /66  Continue midodrine    11/5  Improving with midodrine    11/6  Stable MAP

## 2023-11-06 NOTE — PLAN OF CARE
Problem: Adult Inpatient Plan of Care  Goal: Plan of Care Review  Outcome: Ongoing, Progressing  Goal: Patient-Specific Goal (Individualized)  Outcome: Ongoing, Progressing  Goal: Absence of Hospital-Acquired Illness or Injury  Outcome: Ongoing, Progressing  Goal: Optimal Comfort and Wellbeing  Outcome: Ongoing, Progressing  Goal: Readiness for Transition of Care  Outcome: Ongoing, Progressing     Problem: Adjustment to Illness (Sepsis/Septic Shock)  Goal: Optimal Coping  Outcome: Ongoing, Progressing     Problem: Bleeding (Sepsis/Septic Shock)  Goal: Absence of Bleeding  Outcome: Ongoing, Progressing     Problem: Glycemic Control Impaired (Sepsis/Septic Shock)  Goal: Blood Glucose Level Within Desired Range  Outcome: Ongoing, Progressing     Problem: Infection Progression (Sepsis/Septic Shock)  Goal: Absence of Infection Signs and Symptoms  Outcome: Ongoing, Progressing     Problem: Nutrition Impaired (Sepsis/Septic Shock)  Goal: Optimal Nutrition Intake  Outcome: Ongoing, Progressing     Problem: Fall Injury Risk  Goal: Absence of Fall and Fall-Related Injury  Outcome: Ongoing, Progressing     Problem: Pain Acute  Goal: Acceptable Pain Control and Functional Ability  Outcome: Ongoing, Progressing     Problem: Fatigue  Goal: Improved Activity Tolerance  Outcome: Ongoing, Progressing     Problem: Infection  Goal: Absence of Infection Signs and Symptoms  Outcome: Ongoing, Progressing     Problem: Activity Intolerance  Goal: Enhanced Capacity and Energy  Outcome: Ongoing, Progressing     Problem: Device-Related Complication Risk (Hemodialysis)  Goal: Safe, Effective Therapy Delivery  Outcome: Ongoing, Progressing     Problem: Hemodynamic Instability (Hemodialysis)  Goal: Effective Tissue Perfusion  Outcome: Ongoing, Progressing     Problem: Infection (Hemodialysis)  Goal: Absence of Infection Signs and Symptoms  Outcome: Ongoing, Progressing

## 2023-11-06 NOTE — PLAN OF CARE
SS was consulted on pt for hemodialysis while pt getting set up with home peritoneal dialysis. SS sent info to intake and awaiting approval and chair time for hemodialysis chair. SS notified pt on consult.

## 2023-11-06 NOTE — NURSING
Patient tx completed. 500ml net removed fluiid, patient at dry weight. Catheter ran sluggish. Activase placed and removed to be able to finish hd tx.

## 2023-11-06 NOTE — ASSESSMENT & PLAN NOTE
"This patient does have evidence of infective focus  My overall impression is sepsis.  Source: Abdominal  Antibiotics given-   Antibiotics (72h ago, onward)    None        Latest lactate reviewed-  No results for input(s): "LACTATE" in the last 72 hours.  Organ dysfunction indicated by Acute liver injury    Fluid challenge Other- Patient to receive 250 ml volume other than 30cc/kg due to End Stage Renal Disease     Post- resuscitation assessment Yes Perfusion exam was performed within 6 hours of septic shock presentation after bolus shows Adequate tissue perfusion assessed by non-invasive monitoring       Will Not start Pressors- Levophed for MAP of 65  Source control achieved by: antibiotics    11/4  Bcx ng at 24 hours   Continue fluconazole for fungal peritonitis     11/5  Resolved  Bcx ng at 48 hours   Continue fluconazole  Possible discharge tomorrow after dialysis    "

## 2023-11-06 NOTE — PROGRESS NOTES
[facility] Hospital Medicine  Progress Note    Patient Name: Marian Tabares  MRN: [id number]  Patient Class: IP- Inpatient   Admission Date: 11/2/2023  Length of Stay: 3 days  Attending Physician: Shon Paul Jr., MD  Primary Care Provider: Darlene, Primary Doctor        Subjective:     Principal Problem:Peritonitis        HPI:  Patient is a 44 yo female who presents to the hospital with a complaint of abdominal pain and hypotension. Patient has a history of ESRD since 2007 due to  HIV and is on home PD every night. She described that pain as a diffuse abdominal pain more pronounced at the periumbilical region that started 14 days ago but worsened over the past few days. Associated symptoms include chills, nausea, vomiting and bloating, but denies fever, chest pain, palpitations, and shortness of breath. She was seen by her PCP who told her that she could have a fungal infection and was thus started on fungal infection without improvement of her symptoms, which culminated in this ER visit today. Of note, she was admitted at [facility] in May 2022 for abdominal pain and hypotension due to recurrent peritonitis, but they were unable to identify the organism and infectious disease recommended empiric antibiotics through vancomycin and Cefepime for three weeks. Prior to that, she was admitted to ICU at [facility] for syncope and septic shock due to MSSA peritonitis. Patient noted that the pain is similar to the one she had during her previous episodes of peritonitis.     At the emergency department, the patient was afebrile and vital signs with /54, Pulse 87, RR 18 and % at room air on arrival and then about four hours later she become hypotensive with BP of 89/51 and tachypneic with RR 22 meeting sepsis while in the emergency department. She was already started on antibiotics prior to meeting sepsis criteria. Nevertheless, she was treated per sepsis protocol with gentle hydration due to ESRD  along with blood collection for culture and lactic acid level. Ensuring work up revealed paracentesis suggestive of peritonitis with cell count excluding RBCs of 3589 and Polys 72. CBC was normal with WBC 8.8, H&H 11.1/36.4 and . CMP was significant for Na 136, hypokalemia with K 2.7, BUN/CR 36/15 with baseline CR of 13.3 and GFR 3. Mg of 1.1. Patient will be admitted for further evaluation and management.                 Overview/Hospital Course:  No notes on file    Interval History: Patient due to get HD this morning. Pending scheduling to get HD outpatient. Start nystatin for oral thrush.     Review of Systems   Constitutional:  Negative for chills, diaphoresis, fatigue and fever.   HENT:  Positive for sore throat. Negative for sinus pressure, sinus pain and tinnitus.    Eyes:  Negative for visual disturbance.   Respiratory:  Negative for cough, chest tightness and shortness of breath.    Cardiovascular:  Negative for chest pain, palpitations and leg swelling.   Gastrointestinal:  Positive for abdominal distention and abdominal pain. Negative for anal bleeding, nausea and vomiting.   Genitourinary:  Negative for hematuria.   Musculoskeletal:  Negative for neck pain and neck stiffness.   Skin:  Negative for wound.   Neurological:  Negative for tremors, seizures, syncope and speech difficulty.   Psychiatric/Behavioral:  Negative for agitation, behavioral problems and confusion.      Objective:     Vital Signs (Most Recent):  Temp: 97.9 °F (36.6 °C) (11/06/23 0644)  Pulse: 64 (11/06/23 0644)  Resp: 18 (11/06/23 0644)  BP: 100/66 (11/06/23 0644)  SpO2: 96 % (11/06/23 0644) Vital Signs (24h Range):  Temp:  [97.9 °F (36.6 °C)-98.6 °F (37 °C)] 97.9 °F (36.6 °C)  Pulse:  [61-68] 64  Resp:  [18] 18  SpO2:  [93 %-97 %] 96 %  BP: ()/(60-71) 100/66     Weight: 77.6 kg (171 lb)  Body mass index is 26.78 kg/m².  No intake or output data in the 24 hours ending 11/06/23 0953      Physical Exam  Vitals and nursing  note reviewed.   Constitutional:       General: She is not in acute distress.     Appearance: Normal appearance. She is ill-appearing.   HENT:      Head: Normocephalic and atraumatic.      Right Ear: External ear normal.      Left Ear: External ear normal.      Nose: Nose normal.      Mouth/Throat:      Mouth: Mucous membranes are dry.      Pharynx: Oropharynx is clear.      Comments: Oral thrush  Eyes:      General:         Right eye: No discharge.         Left eye: No discharge.      Conjunctiva/sclera: Conjunctivae normal.   Cardiovascular:      Rate and Rhythm: Normal rate and regular rhythm.      Pulses: Normal pulses.      Heart sounds: Normal heart sounds.   Pulmonary:      Effort: Pulmonary effort is normal.      Breath sounds: Normal breath sounds.   Abdominal:      General: There is distension.      Tenderness: There is abdominal tenderness. There is no guarding or rebound.      Hernia: A hernia is present.      Comments: Umbilical hernia noted   Musculoskeletal:      Cervical back: Neck supple. No tenderness.      Right lower leg: No edema.      Left lower leg: No edema.   Skin:     General: Skin is warm.   Neurological:      General: No focal deficit present.      Mental Status: She is alert and oriented to person, place, and time.   Psychiatric:         Mood and Affect: Mood normal.         Behavior: Behavior normal.         Thought Content: Thought content normal.         Judgment: Judgment normal.             Significant Labs: All pertinent labs within the past 24 hours have been reviewed.    Significant Imaging: I have reviewed all pertinent imaging results/findings within the past 24 hours.      Assessment/Plan:      * Peritonitis  - Continue levaquin  - CBC and renal panel in AM  - Tylenol and pain medication PRN  - Monitor CBC daily    11/4  Peritonitis fungal as confirmed by nephrology last week  Fluconazole 200mg daily     11/5  Continue Diflucan    11/6  Will need oral fluconazole for at least  "2 weeks      ESRD on dialysis  - BUN/CR 36/15  - Avoid nephrotoxic drugs  - Renal dose applicable medication  - Strict I and O  - Monitor renal function daily  - Nephrology consulted for dialysis, thanks for the assistance    11/4  Peritoneal catheter replaced by HD catheter in OR  Plan for hemodialysis Monday   Crt 17.4     11/5  Nephrology planning for HD tomorrow. Per nephrology she will be ok to be discharged after dialysis if a spot is secured in [facility] for outpatient HD.\    11/6  HD planned for this morning    Electrolyte imbalance  - Mg 1.1 and K 2.7  - Replacing Mg and recheck lab in lab  - Replacing K but patient is ESRD on PD  - Nephrology consulted for dialysis, thanks for the assistance    11/4  Plan for hemodialysis Monday  Potassium wnl    11/5  Potassium wnl  Monitor renal panel daily    11/6  K wnl  Corrected calcium 8.6/uncorrected 6.9  Phosphorous 8.7  Nephrology following     Human immunodeficiency virus (HIV) disease  Continue home medication while at the hospital    11/4  Continue HIV medications     11/6  CD4 count (T cells) 196 - will start prophylactic dapsone as patient has bactrim allergy        Hypotension  Continue home midodrine and monitor blood pressure    11/4  Latest /66  Continue midodrine    11/5  Improving with midodrine    11/6  Stable MAP    Oral thrush  11/6  Start nystatin swish QID      Postoperative hypothyroidism  Patient with history of Grave Disease post thyroidectomy  Pending TSH  Adjust the dose of home levothyroxine as indicated     11/4    Increase synthroid to 200mcg daily    Severe sepsis  This patient does have evidence of infective focus  My overall impression is sepsis.  Source: Abdominal  Antibiotics given-   Antibiotics (72h ago, onward)    None        Latest lactate reviewed-  No results for input(s): "LACTATE" in the last 72 hours.  Organ dysfunction indicated by Acute liver injury    Fluid challenge Other- Patient to receive 250 ml volume other " than 30cc/kg due to End Stage Renal Disease     Post- resuscitation assessment Yes Perfusion exam was performed within 6 hours of septic shock presentation after bolus shows Adequate tissue perfusion assessed by non-invasive monitoring       Will Not start Pressors- Levophed for MAP of 65  Source control achieved by: antibiotics    11/4  Bcx ng at 24 hours   Continue fluconazole for fungal peritonitis     11/5  Resolved  Bcx ng at 48 hours   Continue fluconazole  Possible discharge tomorrow after dialysis      Depression  Patient has persistent depression which is moderate and is currently controlled. Will Continue anti-depressant medications. We will not consult psychiatry at this time. Patient does not display psychosis at this time. Continue to monitor closely and adjust plan of care as needed.        GERD (gastroesophageal reflux disease)  Continue home pepcid        VTE Risk Mitigation (From admission, onward)         Ordered     heparin (porcine) injection 5,000 Units  Every 8 hours         11/05/23 1654     IP VTE HIGH RISK PATIENT  Once         11/05/23 1654     Place sequential compression device  Until discontinued         11/03/23 0355                Discharge Planning   AYUSH:      Code Status: Full Code   Is the patient medically ready for discharge?:     Reason for patient still in hospital (select all that apply): Treatment and Consult recommendations  Discharge Plan A: Home                  Hyacinth Sheehan MD  Department of Hospital Medicine   [facility]

## 2023-11-06 NOTE — PROGRESS NOTES
[facility]  Nephrology  Progress Note    Patient Name: Marian Tabares  MRN: [id number]  Admission Date: 11/2/2023  Hospital Length of Stay: 3 days  Attending Provider: Shon Paul Jr., MD   Primary Care Physician: Darlene, Primary Doctor  Principal Problem:Severe sepsis    Consults  Subjective:     Interval History: F/U ESRD. Doing well since removal of PD cath and placement of hemo cath on Saturday. On Diflucan for fungal peritonitis.    Review of patient's allergies indicates:   Allergen Reactions    Ceftazidime Itching    Iron sucrose Itching    Sodium ferric gluconat-sucrose Itching    Cefazolin Itching    Gentamicin Itching     Not allergic     Iron Itching    Soap Hives    Sodium ferric gluconate complex Itching    Sucrose Itching    Adhesive Hives and Rash     No surgical tape    Povidone-iodine Itching, Rash and Hives     Burning to skin  blisters       Current Facility-Administered Medications   Medication Frequency    acetaminophen tablet 1,000 mg Q6H PRN    bisacodyL EC tablet 10 mg Daily PRN    dextromethorphan-guaiFENesin  mg/5 ml liquid 10 mL Q6H PRN    dolutegravir Tab 50 mg Daily    doravirine Tab 100 mg Daily    EScitalopram oxalate tablet 20 mg Daily    famotidine tablet 20 mg QHS    fluconazole tablet 200 mg Daily    heparin (porcine) injection 5,000 Units Q8H    levothyroxine tablet 200 mcg Before breakfast    midodrine tablet 10 mg Q8H    morphine injection 4 mg Q4H PRN    nystatin 100,000 unit/mL suspension 500,000 Units QID (WM & HS)    ondansetron injection 8 mg Q6H PRN    oxyCODONE immediate release tablet 10 mg Q4H PRN    simethicone chewable tablet 80 mg TID PRN    traZODone tablet 50 mg Nightly PRN       Objective:     Vital Signs (Most Recent):  Temp: 97.9 °F (36.6 °C) (11/06/23 0644)  Pulse: 64 (11/06/23 0644)  Resp: 18 (11/06/23 0644)  BP: 100/66 (11/06/23 0644)  SpO2: 96 % (11/06/23 0644) Vital Signs (24h Range):  Temp:  [97.9 °F (36.6 °C)-98.6 °F (37 °C)]  97.9 °F (36.6 °C)  Pulse:  [61-68] 64  Resp:  [18] 18  SpO2:  [93 %-97 %] 96 %  BP: ()/(60-71) 100/66     Weight: 77.6 kg (171 lb) (11/03/23 1332)  Body mass index is 26.78 kg/m².  Body surface area is 1.92 meters squared.    No intake/output data recorded.    Physical Exam Chest clear. Abd soft and non tender.    Significant Labs:sureBMP:   Recent Labs   Lab 11/03/23  0508 11/04/23  0411 11/06/23  0548   GLU  --    < > 85   NA  --    < > 136   K 2.8*   < > 3.7   CL  --    < > 98   CO2  --    < > 26   BUN  --    < > 64*   CREATININE  --    < > 21.70*   CALCIUM  --    < > 6.9*   MG 1.3*  --   --     < > = values in this interval not displayed.       Significant Imaging:  Labs: Reviewed    Assessment/Plan:     Active Diagnoses:    Diagnosis Date Noted POA    PRINCIPAL PROBLEM:  Severe sepsis [A41.9, R65.20] 11/03/2023 Yes    Peritonitis [K65.9] 11/03/2023 Yes    GERD (gastroesophageal reflux disease) [K21.9] 11/03/2023 Yes    Postoperative hypothyroidism [E89.0] 11/03/2023 Yes    Electrolyte abnormality [E87.8] 11/03/2023 Yes    Depression [F32.A] 11/03/2023 Yes    Hypotension [I95.9] 11/03/2023 Yes    Human immunodeficiency virus (HIV) disease [B20]  Yes    ESRD (end stage renal disease) [N18.6]  Yes      Problems Resolved During this Admission:       Continue Diflucan. Hemodialysis today. Home if hemo cath works well and dialysis chair arranged.    Thank you for your consult. I will follow-up with patient. Please contact us if you have any additional questions.    Zachary Lemos MD  Nephrology  [facility]

## 2023-11-06 NOTE — ASSESSMENT & PLAN NOTE
- BUN/CR 36/15  - Avoid nephrotoxic drugs  - Renal dose applicable medication  - Strict I and O  - Monitor renal function daily  - Nephrology consulted for dialysis, thanks for the assistance    11/4  Peritoneal catheter replaced by HD catheter in OR  Plan for hemodialysis Monday   Crt 17.4     11/5  Nephrology planning for HD tomorrow. Per nephrology she will be ok to be discharged after dialysis if a spot is secured in [facility] for outpatient HD.\    11/6  HD planned for this morning

## 2023-11-06 NOTE — ASSESSMENT & PLAN NOTE
- Continue levaquin  - CBC and renal panel in AM  - Tylenol and pain medication PRN  - Monitor CBC daily    11/4  Peritonitis fungal as confirmed by nephrology last week  Fluconazole 200mg daily     11/5  Continue Diflucan    11/6  Will need oral fluconazole for at least 2 weeks

## 2023-11-06 NOTE — DIALYSIS ROUNDING
Pt seen in dialysis unit. Very sluggish flow from red port initially. Dialysis interrupted after 20 minutes and Cathflow Activase instilled in both lumens. We'll try again after that instillation.

## 2023-11-07 VITALS
HEART RATE: 67 BPM | HEIGHT: 67 IN | BODY MASS INDEX: 26.84 KG/M2 | WEIGHT: 171 LBS | RESPIRATION RATE: 18 BRPM | TEMPERATURE: 98 F | DIASTOLIC BLOOD PRESSURE: 75 MMHG | SYSTOLIC BLOOD PRESSURE: 135 MMHG | OXYGEN SATURATION: 98 %

## 2023-11-07 LAB
ALBUMIN SERPL BCP-MCNC: 1.9 G/DL (ref 3.5–5)
ANION GAP SERPL CALCULATED.3IONS-SCNC: 15 MMOL/L (ref 7–16)
BASOPHILS # BLD AUTO: 0.04 K/UL (ref 0–0.2)
BASOPHILS NFR BLD AUTO: 0.7 % (ref 0–1)
BUN SERPL-MCNC: 53 MG/DL (ref 7–18)
BUN/CREAT SERPL: 3 (ref 6–20)
CALCIUM SERPL-MCNC: 6.8 MG/DL (ref 8.5–10.1)
CHLORIDE SERPL-SCNC: 100 MMOL/L (ref 98–107)
CO2 SERPL-SCNC: 26 MMOL/L (ref 21–32)
CREAT SERPL-MCNC: 18.9 MG/DL (ref 0.55–1.02)
DIFFERENTIAL METHOD BLD: ABNORMAL
EGFR (NO RACE VARIABLE) (RUSH/TITUS): 2 ML/MIN/1.73M2
EOSINOPHIL # BLD AUTO: 0.34 K/UL (ref 0–0.5)
EOSINOPHIL NFR BLD AUTO: 5.5 % (ref 1–4)
ERYTHROCYTE [DISTWIDTH] IN BLOOD BY AUTOMATED COUNT: 16.6 % (ref 11.5–14.5)
GLUCOSE SERPL-MCNC: 89 MG/DL (ref 74–106)
HCT VFR BLD AUTO: 29.8 % (ref 38–47)
HGB BLD-MCNC: 9.3 G/DL (ref 12–16)
IMM GRANULOCYTES # BLD AUTO: 0.18 K/UL (ref 0–0.04)
IMM GRANULOCYTES NFR BLD: 2.9 % (ref 0–0.4)
LYMPHOCYTES # BLD AUTO: 0.9 K/UL (ref 1–4.8)
LYMPHOCYTES NFR BLD AUTO: 14.7 % (ref 27–41)
MCH RBC QN AUTO: 26.6 PG (ref 27–31)
MCHC RBC AUTO-ENTMCNC: 31.2 G/DL (ref 32–36)
MCV RBC AUTO: 85.1 FL (ref 80–96)
MONOCYTES # BLD AUTO: 0.44 K/UL (ref 0–0.8)
MONOCYTES NFR BLD AUTO: 7.2 % (ref 2–6)
MPC BLD CALC-MCNC: 9 FL (ref 9.4–12.4)
NEUTROPHILS # BLD AUTO: 4.23 K/UL (ref 1.8–7.7)
NEUTROPHILS NFR BLD AUTO: 69 % (ref 53–65)
NRBC # BLD AUTO: 0 X10E3/UL
NRBC, AUTO (.00): 0 %
PHOSPHATE SERPL-MCNC: 6.9 MG/DL (ref 2.5–4.5)
PLATELET # BLD AUTO: 177 K/UL (ref 150–400)
POTASSIUM SERPL-SCNC: 4 MMOL/L (ref 3.5–5.1)
RBC # BLD AUTO: 3.5 M/UL (ref 4.2–5.4)
SODIUM SERPL-SCNC: 137 MMOL/L (ref 136–145)
WBC # BLD AUTO: 6.13 K/UL (ref 4.5–11)

## 2023-11-07 PROCEDURE — 99239 HOSP IP/OBS DSCHRG MGMT >30: CPT | Mod: GC,,, | Performed by: FAMILY MEDICINE

## 2023-11-07 PROCEDURE — 63600175 PHARM REV CODE 636 W HCPCS: Performed by: GENERAL PRACTICE

## 2023-11-07 PROCEDURE — 80069 RENAL FUNCTION PANEL: CPT | Performed by: GENERAL PRACTICE

## 2023-11-07 PROCEDURE — 94761 N-INVAS EAR/PLS OXIMETRY MLT: CPT

## 2023-11-07 PROCEDURE — 25000003 PHARM REV CODE 250: Performed by: STUDENT IN AN ORGANIZED HEALTH CARE EDUCATION/TRAINING PROGRAM

## 2023-11-07 PROCEDURE — 99239 PR HOSPITAL DISCHARGE DAY,>30 MIN: ICD-10-PCS | Mod: GC,,, | Performed by: FAMILY MEDICINE

## 2023-11-07 PROCEDURE — 63700000 PHARM REV CODE 250 ALT 637 W/O HCPCS

## 2023-11-07 PROCEDURE — 25000003 PHARM REV CODE 250

## 2023-11-07 PROCEDURE — 85025 COMPLETE CBC W/AUTO DIFF WBC: CPT | Performed by: GENERAL PRACTICE

## 2023-11-07 RX ORDER — DAPSONE 100 MG/1
100 TABLET ORAL DAILY
Qty: 30 TABLET | Refills: 0 | Status: SHIPPED | OUTPATIENT
Start: 2023-11-08

## 2023-11-07 RX ORDER — NYSTATIN 100000 [USP'U]/ML
500000 SUSPENSION ORAL
Qty: 200 ML | Refills: 0 | Status: SHIPPED | OUTPATIENT
Start: 2023-11-07 | End: 2023-11-17

## 2023-11-07 RX ORDER — LEVOTHYROXINE SODIUM 200 UG/1
200 TABLET ORAL
Qty: 30 TABLET | Refills: 0 | Status: SHIPPED | OUTPATIENT
Start: 2023-11-08

## 2023-11-07 RX ORDER — FLUCONAZOLE 200 MG/1
200 TABLET ORAL DAILY
Qty: 15 TABLET | Refills: 0 | Status: SHIPPED | OUTPATIENT
Start: 2023-11-08

## 2023-11-07 RX ADMIN — MIDODRINE HYDROCHLORIDE 10 MG: 5 TABLET ORAL at 01:11

## 2023-11-07 RX ADMIN — MIDODRINE HYDROCHLORIDE 10 MG: 5 TABLET ORAL at 06:11

## 2023-11-07 RX ADMIN — LEVOTHYROXINE SODIUM 200 MCG: 100 TABLET ORAL at 06:11

## 2023-11-07 RX ADMIN — FLUCONAZOLE 200 MG: 100 TABLET ORAL at 10:11

## 2023-11-07 RX ADMIN — DAPSONE 100 MG: 25 TABLET ORAL at 10:11

## 2023-11-07 RX ADMIN — HEPARIN SODIUM 5000 UNITS: 5000 INJECTION, SOLUTION INTRAVENOUS; SUBCUTANEOUS at 06:11

## 2023-11-07 RX ADMIN — ESCITALOPRAM 20 MG: 10 TABLET, FILM COATED ORAL at 10:11

## 2023-11-07 RX ADMIN — MUPIROCIN: 20 OINTMENT TOPICAL at 10:11

## 2023-11-07 RX ADMIN — NYSTATIN 500000 UNITS: 100000 SUSPENSION ORAL at 01:11

## 2023-11-07 NOTE — NURSING
At 1100 I called Medical records to see if we can use the form for other facility for the pt, there is no answer, message is left.

## 2023-11-07 NOTE — PLAN OF CARE
"Spoke with Shepard at [facility] as patient states that someone called her a told her that she had a chair time on Tues-Thurs-Sat. Checked the portal and patient still says "submitted". He will check into it and call us back or have that clinic  us back. He is aware that patient is ready for dc.    Devyn at [facility] called back. Patient will dialyze at the [facility] at [address]. Chair time is Tues-Thurs-Sat at 1125am. Patient needs to be there at 1100. He states that a rep did call patient. Will relay info to cm Shannan.  "

## 2023-11-07 NOTE — PHYSICIAN QUERY
PT Name: Marian Tabares  MR #: [id number]     DOCUMENTATION CLARIFICATION     CDS:  Carmen BERGER, RN   Contact information:  [email or web address]     This form is a permanent document in the medical record.    Query Date: November 7, 2023    By submitting this query, we are merely seeking further clarification of documentation to reflect the severity of illness of your patient. Please utilize your independent clinical judgment when addressing the question(s) below.    The Medical Record reflects the following:     Indicators   Supporting Clinical Findings Location in Medical Record   x Lab Value(s)  11/02/23 20:38 11/03/23 05:08   Magnesium  1.1  1.3     Lab 11/2-11/3/23   x Treatment/Medication magnesium sulfate in dextrose IVPB (premix) 1 g  ED 1 Time @ 100 mL/hr over 1 Hours  magnesium sulfate 2g in water 50mL IVPB (premix)    Once @ 25 mL/hr over 2 Hours    MD orders 11/3/23   x Other Electrolyte abnormality  - Mg 1.1 and K 2.7  - Replacing Mg and recheck lab in lab H&P 11/3/2023     Provider, please specify the diagnosis that correspond(s) to the above indicators.   [    ] Hypomagnesemia   ( x ] Other electrolyte disturbance (please specify): ___hypokelemia and hypomagnesemia       Please document in your progress notes daily for the duration of treatment until resolved, and include in your discharge summary.

## 2023-11-07 NOTE — PLAN OF CARE
SS was consulted again for HD chair set up. SS sent info in to [facility] yesterday (per note). SS awaiting conformation of HD chair time.

## 2023-11-07 NOTE — PROGRESS NOTES
[facility]  Nephrology  Progress Note    Patient Name: Marian Tabares  MRN: [id number]  Admission Date: 11/2/2023  Hospital Length of Stay: 4 days  Attending Provider: Shon Paul Jr., MD   Primary Care Physician: Darlene, Primary Doctor  Principal Problem:Peritonitis    Consults  Subjective:     Interval History: Mrs. Tabares is seen in f/u of her ESRD. She dialyzed well yesterday and is ready for discharge pending an outpatient dialysis chair. She is w/o complaint today.    Review of patient's allergies indicates:   Allergen Reactions    Ceftazidime Itching    Iron sucrose Itching    Sodium ferric gluconat-sucrose Itching    Cefazolin Itching    Gentamicin Itching     Not allergic     Iron Itching    Soap Hives    Sodium ferric gluconate complex Itching    Sucrose Itching    Adhesive Hives and Rash     No surgical tape    Povidone-iodine Itching, Rash and Hives     Burning to skin  blisters       Current Facility-Administered Medications   Medication Frequency    0.9%  NaCl infusion PRN    acetaminophen tablet 1,000 mg Q6H PRN    bisacodyL EC tablet 10 mg Daily PRN    dapsone tablet 100 mg Daily    dextromethorphan-guaiFENesin  mg/5 ml liquid 10 mL Q6H PRN    dolutegravir Tab 50 mg Daily    doravirine Tab 100 mg Daily    EScitalopram oxalate tablet 20 mg Daily    famotidine tablet 20 mg QHS    fluconazole tablet 200 mg Daily    heparin (porcine) injection 4,000 Units PRN    heparin (porcine) injection 5,000 Units Q8H    levothyroxine tablet 200 mcg Before breakfast    midodrine tablet 10 mg Q8H    morphine injection 4 mg Q4H PRN    mupirocin 2 % ointment BID    nystatin 100,000 unit/mL suspension 500,000 Units QID (WM & HS)    ondansetron injection 8 mg Q6H PRN    oxyCODONE immediate release tablet 10 mg Q4H PRN    simethicone chewable tablet 80 mg TID PRN    traZODone tablet 50 mg Nightly PRN       Objective:     Vital Signs (Most Recent):  Temp: 98 °F (36.7 °C) (11/07/23 0600)  Pulse:  69 (11/07/23 0600)  Resp: 18 (11/07/23 0600)  BP: 103/63 (11/07/23 0600)  SpO2: 96 % (11/07/23 0600) Vital Signs (24h Range):  Temp:  [98 °F (36.7 °C)-98.7 °F (37.1 °C)] 98 °F (36.7 °C)  Pulse:  [56-69] 69  Resp:  [18-20] 18  SpO2:  [95 %-98 %] 96 %  BP: ()/(47-78) 103/63     Weight: 77.6 kg (171 lb) (11/03/23 1332)  Body mass index is 26.78 kg/m².  Body surface area is 1.92 meters squared.    I/O last 3 completed shifts:  In: 0   Out: 1000 [Other:1000]    Physical Exam No edema. Chest clear    Significant Labs:sureBMP:   Recent Labs   Lab 11/03/23  0508 11/04/23  0411 11/07/23  0635   GLU  --    < > 89   NA  --    < > 137   K 2.8*   < > 4.0   CL  --    < > 100   CO2  --    < > 26   BUN  --    < > 53*   CREATININE  --    < > 18.90*   CALCIUM  --    < > 6.8*   MG 1.3*  --   --     < > = values in this interval not displayed.     All labs within the past 24 hours have been reviewed.    Significant Imaging:  Labs: Reviewed    Assessment/Plan:     Active Diagnoses:    Diagnosis Date Noted POA    PRINCIPAL PROBLEM:  Peritonitis [K65.9] 11/03/2023 Yes    Oral thrush [B37.0] 11/06/2023 Clinically Undetermined    GERD (gastroesophageal reflux disease) [K21.9] 11/03/2023 Yes    Postoperative hypothyroidism [E89.0] 11/03/2023 Yes    Electrolyte imbalance [E87.8] 11/03/2023 Yes    Depression [F32.A] 11/03/2023 Yes    Hypotension [I95.9] 11/03/2023 Yes    Severe sepsis [A41.9, R65.20] 11/03/2023 Yes    Human immunodeficiency virus (HIV) disease [B20]  Yes    ESRD on dialysis [N18.6, Z99.2]  Not Applicable      Problems Resolved During this Admission:       Discharge home when outpatient dialysis chair arranged. Should hear from it this am. Continue Diflucan for another 2 weeks or so.    Thank you for your consult. I will follow-up with patient. Please contact us if you have any additional questions.    Zachary Lemos MD  Nephrology  [facility]

## 2023-11-07 NOTE — NURSING
At 1225 I called  and spoke to Di and she said [facility] and now.

## 2023-11-07 NOTE — PLAN OF CARE
SS called [facility] intake department to check on status of referral for HD chair. They said they did not get info. SS faxed info again and will place in Portal system.

## 2023-11-08 ENCOUNTER — PATIENT OUTREACH (OUTPATIENT)
Dept: ADMINISTRATIVE | Facility: CLINIC | Age: 43
End: 2023-11-08
Payer: MEDICARE

## 2023-11-08 LAB
BACTERIA BLD CULT: NORMAL
BACTERIA BLD CULT: NORMAL

## 2023-11-08 NOTE — HOSPITAL COURSE
Patient is a 44 y/o female who was admitted to the hospital due to peritonitis. Prior to current hospitalization patient was on PD due to ESRD. During this hospitalization PD catheter was removed and culture grew yeast. Patient was started on fluconazole with good response to treatment. General surgery placed a tunneled catheter for HD and nephrology has been following. Patient is current on HIV medications with CD4 count of 196. Patient was started on dapsone to cover for opportunistic infections. Patient was admitted at [facility] in May 2022 for abdominal pain and hypotension due to recurrent peritonitis, but they were unable to identify the organism and infectious disease recommended empiric antibiotics through vancomycin and Cefepime for three weeks. Prior to that, she was admitted to ICU at [facility] for syncope and septic shock due to MSSA peritonitis.    Today patient shows improvement of her condition and reached maximum benefit from this hospitalization. Will resume home medications, add dapsone for opportunistic and nystatin for oral thrush, increase levothyroxine to 200mcg QD and continue diflucan for 2 more weeks as per nephrology recommendations. Patient will need to f/u with PCP, nephrology and infectious disease as outpatient. Dialysis scheduled for Tues-Thurs-Sat at 11:00 am

## 2023-11-08 NOTE — DISCHARGE SUMMARY
[facility] Hospital Medicine  Discharge Summary      Patient Name: Marian Tabares  MRN: [id number]  MICHELLE: [id number]  Patient Class: IP- Inpatient  Admission Date: 11/2/2023  Hospital Length of Stay: 4 days  Discharge Date and Time:  11/07/2023 7:21 PM  Attending Physician: Darlene att. providers found   Discharging Provider: Charlie Espinosa MD  Primary Care Provider: Darlene, Primary Doctor    Primary Care Team: Networked reference to record PCT     HPI:   Patient is a 42 yo female who presents to the hospital with a complaint of abdominal pain and hypotension. Patient has a history of ESRD since 2007 due to  HIV and is on home PD every night. She described that pain as a diffuse abdominal pain more pronounced at the periumbilical region that started 14 days ago but worsened over the past few days. Associated symptoms include chills, nausea, vomiting and bloating, but denies fever, chest pain, palpitations, and shortness of breath. She was seen by her PCP who told her that she could have a fungal infection and was thus started on fungal infection without improvement of her symptoms, which culminated in this ER visit today. Of note, she was admitted at [facility] in May 2022 for abdominal pain and hypotension due to recurrent peritonitis, but they were unable to identify the organism and infectious disease recommended empiric antibiotics through vancomycin and Cefepime for three weeks. Prior to that, she was admitted to ICU at [facility] for syncope and septic shock due to MSSA peritonitis. Patient noted that the pain is similar to the one she had during her previous episodes of peritonitis.     At the emergency department, the patient was afebrile and vital signs with /54, Pulse 87, RR 18 and % at room air on arrival and then about four hours later she become hypotensive with BP of 89/51 and tachypneic with RR 22 meeting sepsis while in the emergency department. She was already started on antibiotics  prior to meeting sepsis criteria. Nevertheless, she was treated per sepsis protocol with gentle hydration due to ESRD along with blood collection for culture and lactic acid level. Ensuring work up revealed paracentesis suggestive of peritonitis with cell count excluding RBCs of 3589 and Polys 72. CBC was normal with WBC 8.8, H&H 11.1/36.4 and . CMP was significant for Na 136, hypokalemia with K 2.7, BUN/CR 36/15 with baseline CR of 13.3 and GFR 3. Mg of 1.1. Patient will be admitted for further evaluation and management.                 Procedure(s) (LRB):  INSERTION, CATHETER, TUNNELED (N/A)  REMOVAL, CATHETER, DIALYSIS, PERITONEAL (N/A)      Hospital Course:   Patient is a 44 y/o female who was admitted to the hospital due to peritonitis. Prior to current hospitalization patient was on PD due to ESRD. During this hospitalization PD catheter was removed and culture grew yeast. Patient was started on fluconazole with good response to treatment. General surgery placed a tunneled catheter for HD and nephrology has been following. Patient is current on HIV medications with CD4 count of 196. Patient was started on dapsone to cover for opportunistic infections. Patient was admitted at [facility] in May 2022 for abdominal pain and hypotension due to recurrent peritonitis, but they were unable to identify the organism and infectious disease recommended empiric antibiotics through vancomycin and Cefepime for three weeks. Prior to that, she was admitted to ICU at [facility] for syncope and septic shock due to MSSA peritonitis.    Today patient shows improvement of her condition and reached maximum benefit from this hospitalization. Will resume home medications, add dapsone for opportunistic and nystatin for oral thrush, increase levothyroxine to 200mcg QD and continue diflucan for 2 more weeks as per nephrology recommendations. Patient will need to f/u with PCP, nephrology and infectious disease as outpatient. Dialysis scheduled  for malkaThurs-Sat at 11:00 am       Goals of Care Treatment Preferences:  Code Status: Full Code      Consults:   Consults (From admission, onward)          Status Ordering Provider     Inpatient consult to Social Work  Once        Provider:  (Not yet assigned)    Completed RALPH CONTE     Inpatient consult to Social Work  Once        Provider:  (Not yet assigned)    Completed JUSTYN ZEPEDA     Inpatient consult to Social Work  Once        Provider:  (Not yet assigned)    Completed DIOMEDES SMALLS     Inpatient consult to General Surgery  Once        Provider:  Jules Álvarez DO    Completed DIOMEDES SMALLS     Pharmacy to dose Vancomycin consult  Once        Provider:  (Not yet assigned)    Completed TAMIA CALLAHAN            Psychiatric  Depression  Patient has persistent depression which is moderate and is currently controlled. Will Continue anti-depressant medications. We will not consult psychiatry at this time. Patient does not display psychosis at this time. F/u with PCP        Cardiac/Vascular  Hypotension  Continue home midodrine and monitor blood pressure    11/4  Latest /66  Continue midodrine    11/5  Improving with midodrine    11/6  Stable MAP    11/7   Continue home Midodrine  F/u with PCP    Renal/  Electrolyte imbalance  - Mg 1.1 and K 2.7  - Replacing Mg and recheck lab in lab  - Replacing K but patient is ESRD on PD  - Nephrology consulted for dialysis, thanks for the assistance    11/4  Plan for hemodialysis Monday  Potassium wnl    11/5  Potassium wnl  Monitor renal panel daily    11/6  K wnl  Corrected calcium 8.6/uncorrected 6.9  Phosphorous 8.7  Nephrology following     11/7  Stable  F/U with PCP and nephrology    ESRD on dialysis  - BUN/CR 36/15  - Avoid nephrotoxic drugs  - Renal dose applicable medication  - Strict I and O  - Monitor renal function daily  - Nephrology consulted for dialysis, thanks for the assistance    11/4  Peritoneal catheter replaced by HD catheter  "in OR  Plan for hemodialysis Monday   Crt 17.4     11/5  Nephrology planning for HD tomorrow. Per nephrology she will be ok to be discharged after dialysis if a spot is secured in [facility] for outpatient HD.\    11/6  HD planned for this morning    11/7  Dialysis scheduled for Tues-Thurs-Sat at 11:00 am  F/u with nephrology      ID  Oral thrush  11/6  Start nystatin swish QID    11/7  Continue oral nystatin f/u with PCP      Severe sepsis  This patient does have evidence of infective focus  My overall impression is sepsis.  Source: Abdominal  Antibiotics given-   Antibiotics (72h ago, onward)      None          Latest lactate reviewed-  No results for input(s): "LACTATE" in the last 72 hours.  Organ dysfunction indicated by Acute liver injury    Fluid challenge Other- Patient to receive 250 ml volume other than 30cc/kg due to End Stage Renal Disease     Post- resuscitation assessment Yes Perfusion exam was performed within 6 hours of septic shock presentation after bolus shows Adequate tissue perfusion assessed by non-invasive monitoring       Will Not start Pressors- Levophed for MAP of 65  Source control achieved by: antibiotics    11/4  Bcx ng at 24 hours   Continue fluconazole for fungal peritonitis     11/5  Resolved  Bcx ng at 48 hours   Continue fluconazole  Possible discharge tomorrow after dialysis      Human immunodeficiency virus (HIV) disease  11/4  Continue HIV medications     11/6  CD4 count (T cells) 196 - will start prophylactic dapsone as patient has bactrim allergy    F/U with PCP and ID as outpatient        Endocrine  Postoperative hypothyroidism  Patient with history of Grave Disease post thyroidectomy  Pending TSH  Adjust the dose of home levothyroxine as indicated     11/4    Increase synthroid to 200mcg daily    11/7  Continue synthroid 200mcg QD  F/u with PCP    GI  * Peritonitis  - Continue levaquin  - CBC and renal panel in AM  - Tylenol and pain medication PRN  - Monitor CBC " daily    11/4  Peritonitis fungal as confirmed by nephrology last week  Fluconazole 200mg daily     11/5  Continue Diflucan    11/6  Will need oral fluconazole for at least 2 weeks    11/7  Continue with fluconazole for 2 more weeks as per nephrology recommendation      GERD (gastroesophageal reflux disease)  Continue home pepcid        Final Active Diagnoses:    Diagnosis Date Noted POA    PRINCIPAL PROBLEM:  Peritonitis [K65.9] 11/03/2023 Yes    Oral thrush [B37.0] 11/06/2023 Clinically Undetermined    GERD (gastroesophageal reflux disease) [K21.9] 11/03/2023 Yes    Postoperative hypothyroidism [E89.0] 11/03/2023 Yes    Electrolyte imbalance [E87.8] 11/03/2023 Yes    Depression [F32.A] 11/03/2023 Yes    Hypotension [I95.9] 11/03/2023 Yes    Severe sepsis [A41.9, R65.20] 11/03/2023 Yes    Human immunodeficiency virus (HIV) disease [B20]  Yes    ESRD on dialysis [N18.6, Z99.2]  Not Applicable      Problems Resolved During this Admission:       Discharged Condition: stable    Disposition: Home or Self Care    Follow Up:   Follow-up Information       Jules Álvarez, DO. Schedule an appointment as soon as possible for a visit in 2 week(s).    Specialties: General Surgery, Surgery  Why: Please follow up on November 21 at 1:15  Contact information:  [address] [facility]  [place] [address]  [phone]               [facility]. Go on 11/9/2023.    Why: Dialysis scheduled for Tues-Thurs-Sat at 11:00 am  Contact information:  [address] [place] Jesús Burton Andre, MD Follow up in 1 week(s).    Specialty: Family Medicine  Why: F/u post hospital discharge   Please follow up on November 16 at 10:00  Contact information:  [address]   [place] [id number] [phone]                           Patient Instructions:      Diet renal     Notify your health care provider if you experience any of the following:  temperature >100.4     Notify your  health care provider if you experience any of the following:  redness, tenderness, or signs of infection (pain, swelling, redness, odor or green/yellow discharge around incision site)     Notify your health care provider if you experience any of the following:  difficulty breathing or increased cough     Activity as tolerated         Pending Diagnostic Studies:       None           Medications:  Reconciled Home Medications:      Medication List        START taking these medications      dapsone 100 MG Tab  Take 1 tablet (100 mg total) by mouth once daily.  Start taking on: November 8, 2023     nystatin 100,000 unit/mL suspension  Commonly known as: MYCOSTATIN  Take 5 mLs (500,000 Units total) by mouth 4 (four) times daily with meals and nightly. for 10 days            CHANGE how you take these medications      fluconazole 200 MG Tab  Commonly known as: DIFLUCAN  Take 1 tablet (200 mg total) by mouth once daily.  Start taking on: November 8, 2023  What changed:   medication strength  how much to take  additional instructions     levothyroxine 200 MCG tablet  Commonly known as: SYNTHROID  Take 1 tablet (200 mcg total) by mouth before breakfast.  Start taking on: November 8, 2023  What changed:   medication strength  how much to take  when to take this            CONTINUE taking these medications      aspirin 81 MG EC tablet  Commonly known as: ECOTRIN  Take 81 mg by mouth once daily.     EScitalopram oxalate 20 MG tablet  Commonly known as: LEXAPRO  Take 20 mg by mouth.     famotidine 20 MG tablet  Commonly known as: PEPCID  Take 20 mg by mouth every evening.     HYDROcodone-acetaminophen 7.5-325 mg per tablet  Commonly known as: NORCO  Take 1 tablet by mouth.     midodrine 10 MG tablet  Commonly known as: PROAMATINE  Take 10 mg by mouth.     PIFELTRO 100 mg Tab  Generic drug: doravirine  Take 1 tablet by mouth once daily.     TIVICAY 50 mg Tab  Generic drug: dolutegravir  Take 1 tablet by mouth once daily. Take with  pifeltro              Indwelling Lines/Drains at time of discharge:   Lines/Drains/Airways       Central Venous Catheter Line  Duration                  Hemodialysis Catheter right femoral -- days                    Time spent on the discharge of patient: 40 minutes         Charlie Espinosa MD  Department of Hospital Medicine  [facility]

## 2023-11-08 NOTE — ASSESSMENT & PLAN NOTE
- BUN/CR 36/15  - Avoid nephrotoxic drugs  - Renal dose applicable medication  - Strict I and O  - Monitor renal function daily  - Nephrology consulted for dialysis, thanks for the assistance    11/4  Peritoneal catheter replaced by HD catheter in OR  Plan for hemodialysis Monday   Crt 17.4     11/5  Nephrology planning for HD tomorrow. Per nephrology she will be ok to be discharged after dialysis if a spot is secured in [facility] for outpatient HD.\    11/6  HD planned for this morning    11/7  Dialysis scheduled for Tues-Thurs-Sat at 11:00 am  F/u with nephrology

## 2023-11-08 NOTE — PLAN OF CARE
[facility]  Discharge Final Note    Primary Care Provider: No, Primary Doctor    Expected Discharge Date: 11/7/2023    Final Discharge Note (most recent)       Final Note - 11/08/23 0847          Final Note    Assessment Type Final Discharge Note     Anticipated Discharge Disposition Home or Self Care        Post-Acute Status    Post-Acute Authorization Dialysis     Diaylsis Status Set-up Complete/Auth obtained     Discharge Delays None known at this time                     Important Message from Medicare  Important Message from Medicare regarding Discharge Appeal Rights: Given to patient/caregiver, Explained to patient/caregiver, Signed/date by patient/caregiver     Date IMM was signed: 11/07/23  Time IMM was signed: 1420    Contact Info       Jules Álvarez DO   Specialty: General Surgery, Surgery    [address] [facility]  [place] [address]   Phone: [phone]       Next Steps: Schedule an appointment as soon as possible for a visit in 2 week(s)    Instructions: Please follow up on November 21 at 1:15    [facility]    [address]  [place]       Next Steps: Go on 11/9/2023    Instructions: Dialysis scheduled for Tues-Thurs-Sat at 11:00 am    Charlie Espinosa MD   Specialty: Family Medicine    [address] [facility]  [place] [address]   Phone: [phone]       Next Steps: Follow up in 1 week(s)    Instructions: F/u post hospital discharge   Please follow up on November 16 at 10:00          Pt discharged home, Per Devyn dialysis was arranged.

## 2023-11-08 NOTE — ASSESSMENT & PLAN NOTE
Patient has persistent depression which is moderate and is currently controlled. Will Continue anti-depressant medications. We will not consult psychiatry at this time. Patient does not display psychosis at this time. F/u with PCP

## 2023-11-08 NOTE — PROGRESS NOTES
C3 nurse spoke with Marian Tabares  for a TCC post hospital discharge follow up call. The patient has a scheduled HOS appointment with Dr Charlie Espinosa on 11/16/23 @ 1000.  Pt reports she takes Lexapro 20mg daily. She reports she also takes calcitriol 0.25mg daily, Tums with meals.

## 2023-11-08 NOTE — ASSESSMENT & PLAN NOTE
Continue home midodrine and monitor blood pressure    11/4  Latest /66  Continue midodrine    11/5  Improving with midodrine    11/6  Stable MAP    11/7   Continue home Midodrine  F/u with PCP

## 2023-11-08 NOTE — ASSESSMENT & PLAN NOTE
Patient with history of Grave Disease post thyroidectomy  Pending TSH  Adjust the dose of home levothyroxine as indicated     11/4    Increase synthroid to 200mcg daily    11/7  Continue synthroid 200mcg QD  F/u with PCP

## 2023-11-08 NOTE — ASSESSMENT & PLAN NOTE
- Mg 1.1 and K 2.7  - Replacing Mg and recheck lab in lab  - Replacing K but patient is ESRD on PD  - Nephrology consulted for dialysis, thanks for the assistance    11/4  Plan for hemodialysis Monday  Potassium wnl    11/5  Potassium wnl  Monitor renal panel daily    11/6  K wnl  Corrected calcium 8.6/uncorrected 6.9  Phosphorous 8.7  Nephrology following     11/7  Stable  F/U with PCP and nephrology

## 2023-11-08 NOTE — ASSESSMENT & PLAN NOTE
11/4  Continue HIV medications     11/6  CD4 count (T cells) 196 - will start prophylactic dapsone as patient has bactrim allergy    F/U with PCP and ID as outpatient

## 2023-11-08 NOTE — ASSESSMENT & PLAN NOTE
- Continue levaquin  - CBC and renal panel in AM  - Tylenol and pain medication PRN  - Monitor CBC daily    11/4  Peritonitis fungal as confirmed by nephrology last week  Fluconazole 200mg daily     11/5  Continue Diflucan    11/6  Will need oral fluconazole for at least 2 weeks    11/7  Continue with fluconazole for 2 more weeks as per nephrology recommendation

## 2023-11-14 ENCOUNTER — OFFICE VISIT (OUTPATIENT)
Dept: SURGERY | Facility: CLINIC | Age: 43
End: 2023-11-14
Payer: MEDICARE

## 2023-11-14 DIAGNOSIS — T82.9XXA COMPLICATION ASSOCIATED WITH DIALYSIS CATHETER: Primary | ICD-10-CM

## 2023-11-14 DIAGNOSIS — N18.6 ESRD ON DIALYSIS: ICD-10-CM

## 2023-11-14 DIAGNOSIS — Z99.2 ESRD ON DIALYSIS: ICD-10-CM

## 2023-11-14 PROCEDURE — 99024 POSTOP FOLLOW-UP VISIT: CPT | Mod: ,,, | Performed by: STUDENT IN AN ORGANIZED HEALTH CARE EDUCATION/TRAINING PROGRAM

## 2023-11-14 PROCEDURE — 3066F PR DOCUMENTATION OF TREATMENT FOR NEPHROPATHY: ICD-10-PCS | Mod: CPTII,,, | Performed by: STUDENT IN AN ORGANIZED HEALTH CARE EDUCATION/TRAINING PROGRAM

## 2023-11-14 PROCEDURE — 3066F NEPHROPATHY DOC TX: CPT | Mod: CPTII,,, | Performed by: STUDENT IN AN ORGANIZED HEALTH CARE EDUCATION/TRAINING PROGRAM

## 2023-11-14 PROCEDURE — 1159F MED LIST DOCD IN RCRD: CPT | Mod: CPTII,,, | Performed by: STUDENT IN AN ORGANIZED HEALTH CARE EDUCATION/TRAINING PROGRAM

## 2023-11-14 PROCEDURE — 99213 OFFICE O/P EST LOW 20 MIN: CPT | Mod: PBBFAC | Performed by: STUDENT IN AN ORGANIZED HEALTH CARE EDUCATION/TRAINING PROGRAM

## 2023-11-14 PROCEDURE — 99024 PR POST-OP FOLLOW-UP VISIT: ICD-10-PCS | Mod: ,,, | Performed by: STUDENT IN AN ORGANIZED HEALTH CARE EDUCATION/TRAINING PROGRAM

## 2023-11-14 PROCEDURE — 1159F PR MEDICATION LIST DOCUMENTED IN MEDICAL RECORD: ICD-10-PCS | Mod: CPTII,,, | Performed by: STUDENT IN AN ORGANIZED HEALTH CARE EDUCATION/TRAINING PROGRAM

## 2023-11-14 NOTE — PROGRESS NOTES
Subjective     Patient ID: Marian Tabares is a 43 y.o. female.    Chief Complaint: Post-op Evaluation    43-year-old  female presents to the clinic for evaluation for malfunctioning dialysis catheter.  Patient had a tunneled dialysis catheter placed in the right femoral vein and a infected peritoneal dialysis catheter removed on November 4th.  Patient was discharged home to follow-up with myself in clinic.  Patient went to dialysis and they state she is having low flow rates in the catheter; patient states she gets flow rates of about 150 when sitting down; if the patient stands up, her flow rates are above 300, thus they are able to use the catheter somewhat.  Patient has a occluded bilateral internal jugular veins, and occluded left femoral vein.  Denies any chest pain/shortness of breath, nausea/vomiting/diarrhea, fever/chills.      Review of Systems   Constitutional: Negative.    HENT: Negative.     Eyes: Negative.    Respiratory:  Negative for shortness of breath.    Cardiovascular:  Negative for chest pain.   Gastrointestinal:  Negative for abdominal distention, abdominal pain, blood in stool and change in bowel habit.   Genitourinary: Negative.  Negative for hematuria.   Musculoskeletal:  Negative for myalgias.   Neurological:  Negative for dizziness and weakness.   Psychiatric/Behavioral: Negative.            Objective     Physical Exam  Constitutional:       General: She is not in acute distress.     Appearance: Normal appearance.   HENT:      Head: Normocephalic.   Cardiovascular:      Rate and Rhythm: Normal rate.   Pulmonary:      Effort: Pulmonary effort is normal. No respiratory distress.   Abdominal:      General: There is no distension.      Tenderness: There is no abdominal tenderness.          Comments: Incision clean dry and intact with staples; healing well   Musculoskeletal:         General: Normal range of motion.   Skin:     General: Skin is warm.      Coloration: Skin is not  jaundiced.   Neurological:      General: No focal deficit present.      Mental Status: She is alert and oriented to person, place, and time.      Cranial Nerves: No cranial nerve deficit.            Assessment and Plan     1. Complication associated with dialysis catheter    2. ESRD on dialysis        Dialysis to continue to use catheter as best as possible; patient has no other venous access and currently the catheter is patent; use Cathflo as needed.  We will refer to Cole Rajan's with Interventional Radiology for evaluation for insertion of tunneled hemodialysis catheter under fluoroscopy.  Patient to follow back up in 3 weeks         No follow-ups on file.

## 2023-11-15 DIAGNOSIS — T82.9XXA COMPLICATION ASSOCIATED WITH DIALYSIS CATHETER: Primary | ICD-10-CM

## 2023-11-27 ENCOUNTER — OFFICE VISIT (OUTPATIENT)
Dept: PRIMARY CARE CLINIC | Facility: CLINIC | Age: 43
End: 2023-11-27
Payer: MEDICARE

## 2023-11-27 VITALS
SYSTOLIC BLOOD PRESSURE: 125 MMHG | TEMPERATURE: 97 F | DIASTOLIC BLOOD PRESSURE: 82 MMHG | RESPIRATION RATE: 18 BRPM | WEIGHT: 175 LBS | HEART RATE: 76 BPM | HEIGHT: 67 IN | BODY MASS INDEX: 27.47 KG/M2 | OXYGEN SATURATION: 98 %

## 2023-11-27 DIAGNOSIS — S13.4XXD WHIPLASH INJURY SYNDROME, SUBSEQUENT ENCOUNTER: Primary | ICD-10-CM

## 2023-11-27 DIAGNOSIS — Z99.2 DEPENDENCE ON INTERMITTENT RENAL DIALYSIS: ICD-10-CM

## 2023-11-27 PROBLEM — Z23 ENCOUNTER FOR IMMUNIZATION: Status: ACTIVE | Noted: 2021-03-02

## 2023-11-27 PROBLEM — E89.0 S/P THYROIDECTOMY: Status: ACTIVE | Noted: 2018-02-23

## 2023-11-27 PROBLEM — A41.9 SEPTIC SHOCK: Status: ACTIVE | Noted: 2019-01-19

## 2023-11-27 PROBLEM — T78.2XXA ANAPHYLACTIC SHOCK, UNSPECIFIED, INITIAL ENCOUNTER: Status: ACTIVE | Noted: 2021-09-09

## 2023-11-27 PROBLEM — N13.30 HYDRONEPHROSIS OF LEFT KIDNEY: Status: ACTIVE | Noted: 2022-05-07

## 2023-11-27 PROBLEM — D63.1 ANEMIA IN CHRONIC KIDNEY DISEASE: Status: ACTIVE | Noted: 2021-03-02

## 2023-11-27 PROBLEM — Z90.89 S/P THYROIDECTOMY: Status: ACTIVE | Noted: 2018-02-23

## 2023-11-27 PROBLEM — E88.09 OTHER DISORDERS OF PLASMA-PROTEIN METABOLISM, NOT ELSEWHERE CLASSIFIED: Status: ACTIVE | Noted: 2021-03-02

## 2023-11-27 PROBLEM — R10.84 GENERALIZED ABDOMINAL PAIN: Status: ACTIVE | Noted: 2023-10-23

## 2023-11-27 PROBLEM — I13.11 HYPERTENSIVE HEART AND CHRONIC KIDNEY DISEASE WITHOUT HEART FAILURE, WITH STAGE 5 CHRONIC KIDNEY DISEASE, OR END STAGE RENAL DISEASE: Status: ACTIVE | Noted: 2022-10-26

## 2023-11-27 PROBLEM — E87.6 HYPOKALEMIA: Status: ACTIVE | Noted: 2019-01-19

## 2023-11-27 PROBLEM — E46 UNSPECIFIED PROTEIN-CALORIE MALNUTRITION: Status: ACTIVE | Noted: 2023-11-22

## 2023-11-27 PROBLEM — Z11.1 ENCOUNTER FOR SCREENING FOR RESPIRATORY TUBERCULOSIS: Status: ACTIVE | Noted: 2021-03-02

## 2023-11-27 PROBLEM — Z49.32 ENCOUNTER FOR ADEQUACY TESTING FOR PERITONEAL DIALYSIS: Status: ACTIVE | Noted: 2021-10-26

## 2023-11-27 PROBLEM — R10.13 EPIGASTRIC PAIN: Status: ACTIVE | Noted: 2022-05-03

## 2023-11-27 PROBLEM — I10 ESSENTIAL (PRIMARY) HYPERTENSION: Status: ACTIVE | Noted: 2021-03-02

## 2023-11-27 PROBLEM — T78.40XA ALLERGY, UNSPECIFIED, INITIAL ENCOUNTER: Status: ACTIVE | Noted: 2021-09-09

## 2023-11-27 PROBLEM — D84.9 IMMUNOSUPPRESSED STATUS: Status: ACTIVE | Noted: 2023-11-27

## 2023-11-27 PROBLEM — E03.9 SEVERE HYPOTHYROIDISM: Status: ACTIVE | Noted: 2022-05-07

## 2023-11-27 PROBLEM — D68.9 COAGULATION DEFECT, UNSPECIFIED: Status: ACTIVE | Noted: 2023-11-09

## 2023-11-27 PROBLEM — Z51.81 ENCOUNTER FOR THERAPEUTIC DRUG LEVEL MONITORING: Status: ACTIVE | Noted: 2023-10-10

## 2023-11-27 PROBLEM — N18.9 ANEMIA IN CHRONIC KIDNEY DISEASE: Status: ACTIVE | Noted: 2021-03-02

## 2023-11-27 PROBLEM — E87.29 HIGH ANION GAP METABOLIC ACIDOSIS: Status: ACTIVE | Noted: 2022-05-17

## 2023-11-27 PROBLEM — Z98.890 S/P THYROIDECTOMY: Status: ACTIVE | Noted: 2018-02-23

## 2023-11-27 PROBLEM — T82.599A MECHANICAL COMPLICATION OF CARDIOVASCULAR DEVICE: Status: ACTIVE | Noted: 2023-11-22

## 2023-11-27 PROBLEM — T86.10: Status: ACTIVE | Noted: 2023-11-09

## 2023-11-27 PROBLEM — L50.0 ALLERGIC URTICARIA: Status: ACTIVE | Noted: 2021-03-02

## 2023-11-27 PROBLEM — D50.9 IRON DEFICIENCY ANEMIA, UNSPECIFIED: Status: ACTIVE | Noted: 2022-10-26

## 2023-11-27 PROBLEM — R65.21 SEPTIC SHOCK: Status: ACTIVE | Noted: 2019-01-19

## 2023-11-27 PROBLEM — N25.81 SECONDARY HYPERPARATHYROIDISM OF RENAL ORIGIN: Status: ACTIVE | Noted: 2021-03-02

## 2023-11-27 PROBLEM — Z49.31 ENCOUNTER FOR ADEQUACY TESTING FOR HEMODIALYSIS: Status: ACTIVE | Noted: 2021-03-02

## 2023-11-27 PROBLEM — R06.02 SHORTNESS OF BREATH: Status: ACTIVE | Noted: 2023-11-09

## 2023-11-27 PROCEDURE — 1159F MED LIST DOCD IN RCRD: CPT | Mod: ,,, | Performed by: FAMILY MEDICINE

## 2023-11-27 PROCEDURE — 1160F PR REVIEW ALL MEDS BY PRESCRIBER/CLIN PHARMACIST DOCUMENTED: ICD-10-PCS | Mod: ,,, | Performed by: FAMILY MEDICINE

## 2023-11-27 PROCEDURE — 3008F PR BODY MASS INDEX (BMI) DOCUMENTED: ICD-10-PCS | Mod: ,,, | Performed by: FAMILY MEDICINE

## 2023-11-27 PROCEDURE — 1111F PR DISCHARGE MEDS RECONCILED W/ CURRENT OUTPATIENT MED LIST: ICD-10-PCS | Mod: ,,, | Performed by: FAMILY MEDICINE

## 2023-11-27 PROCEDURE — 1159F PR MEDICATION LIST DOCUMENTED IN MEDICAL RECORD: ICD-10-PCS | Mod: ,,, | Performed by: FAMILY MEDICINE

## 2023-11-27 PROCEDURE — 3008F BODY MASS INDEX DOCD: CPT | Mod: ,,, | Performed by: FAMILY MEDICINE

## 2023-11-27 PROCEDURE — 3066F PR DOCUMENTATION OF TREATMENT FOR NEPHROPATHY: ICD-10-PCS | Mod: ,,, | Performed by: FAMILY MEDICINE

## 2023-11-27 PROCEDURE — 3074F PR MOST RECENT SYSTOLIC BLOOD PRESSURE < 130 MM HG: ICD-10-PCS | Mod: ,,, | Performed by: FAMILY MEDICINE

## 2023-11-27 PROCEDURE — 1111F DSCHRG MED/CURRENT MED MERGE: CPT | Mod: ,,, | Performed by: FAMILY MEDICINE

## 2023-11-27 PROCEDURE — 3079F DIAST BP 80-89 MM HG: CPT | Mod: ,,, | Performed by: FAMILY MEDICINE

## 2023-11-27 PROCEDURE — 99212 PR OFFICE/OUTPT VISIT, EST, LEVL II, 10-19 MIN: ICD-10-PCS | Mod: ,,, | Performed by: FAMILY MEDICINE

## 2023-11-27 PROCEDURE — 3074F SYST BP LT 130 MM HG: CPT | Mod: ,,, | Performed by: FAMILY MEDICINE

## 2023-11-27 PROCEDURE — 1160F RVW MEDS BY RX/DR IN RCRD: CPT | Mod: ,,, | Performed by: FAMILY MEDICINE

## 2023-11-27 PROCEDURE — 3079F PR MOST RECENT DIASTOLIC BLOOD PRESSURE 80-89 MM HG: ICD-10-PCS | Mod: ,,, | Performed by: FAMILY MEDICINE

## 2023-11-27 PROCEDURE — 99212 OFFICE O/P EST SF 10 MIN: CPT | Mod: ,,, | Performed by: FAMILY MEDICINE

## 2023-11-27 PROCEDURE — 3066F NEPHROPATHY DOC TX: CPT | Mod: ,,, | Performed by: FAMILY MEDICINE

## 2023-11-27 RX ORDER — CLINDAMYCIN PHOSPHATE 11.9 MG/ML
SOLUTION TOPICAL
COMMUNITY
Start: 2023-07-25

## 2023-11-27 RX ORDER — FLUTICASONE PROPIONATE 50 MCG
SPRAY, SUSPENSION (ML) NASAL
COMMUNITY
Start: 2023-08-31

## 2023-11-27 RX ORDER — KETOCONAZOLE 20 MG/ML
SHAMPOO, SUSPENSION TOPICAL
COMMUNITY
Start: 2023-10-11

## 2023-11-27 RX ORDER — DOXYCYCLINE 100 MG/1
CAPSULE ORAL
COMMUNITY
Start: 2023-09-08

## 2023-11-27 RX ORDER — SERTRALINE HYDROCHLORIDE 100 MG/1
100 TABLET, FILM COATED ORAL
COMMUNITY
End: 2023-11-27

## 2023-11-27 RX ORDER — FLUOCINONIDE TOPICAL SOLUTION USP, 0.05% 0.5 MG/ML
SOLUTION TOPICAL
COMMUNITY
Start: 2023-10-11

## 2023-11-27 RX ORDER — EMTRICITABINE 200 MG/1
200 CAPSULE ORAL
COMMUNITY

## 2023-11-27 RX ORDER — CALCITRIOL 0.25 UG/1
CAPSULE ORAL
COMMUNITY
Start: 2023-01-02

## 2023-11-27 RX ORDER — FOLIC ACID 1 MG/1
1 TABLET ORAL
COMMUNITY

## 2023-11-27 RX ORDER — VALACYCLOVIR HYDROCHLORIDE 500 MG/1
TABLET, FILM COATED ORAL
COMMUNITY
Start: 2023-09-08

## 2023-11-27 RX ORDER — DOXYCYCLINE 100 MG/1
100 CAPSULE ORAL 2 TIMES DAILY
COMMUNITY
Start: 2023-10-11

## 2023-11-27 RX ORDER — OMEPRAZOLE 20 MG/1
CAPSULE, DELAYED RELEASE ORAL
COMMUNITY
Start: 2022-04-04

## 2023-11-27 RX ORDER — CALCIUM CARBONATE 400(1000)
1000 TABLET,CHEWABLE ORAL 3 TIMES DAILY
COMMUNITY

## 2023-11-27 RX ORDER — FLUDROCORTISONE ACETATE 0.1 MG/1
0.05 TABLET ORAL
COMMUNITY

## 2023-11-27 RX ORDER — LANTHANUM CARBONATE 1000 MG/1
1 TABLET, CHEWABLE ORAL
COMMUNITY
Start: 2023-09-20
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
## 2023-11-28 DIAGNOSIS — T82.9XXA COMPLICATION ASSOCIATED WITH DIALYSIS CATHETER: Primary | ICD-10-CM

## 2023-12-05 ENCOUNTER — OFFICE VISIT (OUTPATIENT)
Dept: SURGERY | Facility: CLINIC | Age: 43
End: 2023-12-05
Payer: MEDICARE

## 2023-12-05 DIAGNOSIS — N18.6 ESRD ON DIALYSIS: ICD-10-CM

## 2023-12-05 DIAGNOSIS — T82.9XXA COMPLICATION ASSOCIATED WITH DIALYSIS CATHETER: Primary | ICD-10-CM

## 2023-12-05 DIAGNOSIS — Z99.2 ESRD ON DIALYSIS: ICD-10-CM

## 2023-12-05 PROCEDURE — 99214 OFFICE O/P EST MOD 30 MIN: CPT | Mod: PBBFAC | Performed by: STUDENT IN AN ORGANIZED HEALTH CARE EDUCATION/TRAINING PROGRAM

## 2023-12-05 PROCEDURE — 1159F PR MEDICATION LIST DOCUMENTED IN MEDICAL RECORD: ICD-10-PCS | Mod: CPTII,,, | Performed by: STUDENT IN AN ORGANIZED HEALTH CARE EDUCATION/TRAINING PROGRAM

## 2023-12-05 PROCEDURE — 1111F DSCHRG MED/CURRENT MED MERGE: CPT | Mod: CPTII,,, | Performed by: STUDENT IN AN ORGANIZED HEALTH CARE EDUCATION/TRAINING PROGRAM

## 2023-12-05 PROCEDURE — 99213 OFFICE O/P EST LOW 20 MIN: CPT | Mod: S$PBB,,, | Performed by: STUDENT IN AN ORGANIZED HEALTH CARE EDUCATION/TRAINING PROGRAM

## 2023-12-05 PROCEDURE — 1159F MED LIST DOCD IN RCRD: CPT | Mod: CPTII,,, | Performed by: STUDENT IN AN ORGANIZED HEALTH CARE EDUCATION/TRAINING PROGRAM

## 2023-12-05 PROCEDURE — 3066F PR DOCUMENTATION OF TREATMENT FOR NEPHROPATHY: ICD-10-PCS | Mod: CPTII,,, | Performed by: STUDENT IN AN ORGANIZED HEALTH CARE EDUCATION/TRAINING PROGRAM

## 2023-12-05 PROCEDURE — 99213 PR OFFICE/OUTPT VISIT, EST, LEVL III, 20-29 MIN: ICD-10-PCS | Mod: S$PBB,,, | Performed by: STUDENT IN AN ORGANIZED HEALTH CARE EDUCATION/TRAINING PROGRAM

## 2023-12-05 PROCEDURE — 1111F PR DISCHARGE MEDS RECONCILED W/ CURRENT OUTPATIENT MED LIST: ICD-10-PCS | Mod: CPTII,,, | Performed by: STUDENT IN AN ORGANIZED HEALTH CARE EDUCATION/TRAINING PROGRAM

## 2023-12-05 PROCEDURE — 3066F NEPHROPATHY DOC TX: CPT | Mod: CPTII,,, | Performed by: STUDENT IN AN ORGANIZED HEALTH CARE EDUCATION/TRAINING PROGRAM

## 2023-12-05 NOTE — PROGRESS NOTES
Subjective     Patient ID: Marian Tabares is a 43 y.o. female.    Chief Complaint: Follow-up (3wk f/u; has appt with Dr. Tobias MD tomorrow at [place] )    43-year-old  female presents to the clinic for evaluation for malfunctioning dialysis catheter.  Patient had a tunneled dialysis catheter placed in the right femoral vein and a infected peritoneal dialysis catheter removed on November 4th.  Patient was discharged home to follow-up with myself in clinic.  Patient went to dialysis and they state she is having low flow rates in the catheter; patient states she gets flow rates of about 150 when sitting down; if the patient stands up, her flow rates are above 300, thus they are able to use the catheter somewhat.  Patient has a occluded bilateral internal jugular veins, and occluded left femoral vein.  Denies any chest pain/shortness of breath, nausea/vomiting/diarrhea, fever/chills.    12/5:  Patient doing well.  States that her flow rates dialysis saw around 200, but after administering Activase, flow rates go up to 300-350.  Patient does have a appointment with Dr. Mathis's with Interventional Radiology for possible catheter exchange.  Patient will discuss with Nephrology regarding if she can continue peritoneal dialysis.  She was 1 month out from having positive cultures with yeast in her peritoneal fluid.  Patient completing Diflucan in oral antibiotics over the next week.  Denies any chest pain/shortness of breath, nausea/vomiting/diarrhea, fever/chills.      Review of Systems   Constitutional: Negative.    HENT: Negative.     Eyes: Negative.    Respiratory:  Negative for shortness of breath.    Cardiovascular:  Negative for chest pain.   Gastrointestinal:  Negative for abdominal distention, abdominal pain, blood in stool and change in bowel habit.   Genitourinary: Negative.  Negative for hematuria.   Musculoskeletal:  Negative for myalgias.   Neurological:  Negative for dizziness and weakness.    Psychiatric/Behavioral: Negative.            Objective     Physical Exam  Constitutional:       General: She is not in acute distress.     Appearance: Normal appearance.   HENT:      Head: Normocephalic.   Cardiovascular:      Rate and Rhythm: Normal rate.   Pulmonary:      Effort: Pulmonary effort is normal. No respiratory distress.   Abdominal:      General: There is no distension.      Tenderness: There is no abdominal tenderness.          Comments: Incision clean dry and intact with staples; healed   Musculoskeletal:         General: Normal range of motion.   Skin:     General: Skin is warm.      Coloration: Skin is not jaundiced.   Neurological:      General: No focal deficit present.      Mental Status: She is alert and oriented to person, place, and time.      Cranial Nerves: No cranial nerve deficit.            Assessment and Plan     1. Complication associated with dialysis catheter    2. ESRD on dialysis        Follow-up with Interventional Radiology tomorrow.  Staples out.  Patient to follow-up with Nephrology and if deemed appropriate candidate for peritoneal dialysis, we will have the patient follow back up in set up for peritoneal dialysis catheter insertion         No follow-ups on file.

## 2023-12-15 ENCOUNTER — PATIENT MESSAGE (OUTPATIENT)
Dept: SURGERY | Facility: CLINIC | Age: 43
End: 2023-12-15
Payer: MEDICARE

## 2024-02-05 PROBLEM — A41.9 SEVERE SEPSIS: Status: RESOLVED | Noted: 2023-11-03 | Resolved: 2024-02-05

## 2024-02-05 PROBLEM — R65.20 SEVERE SEPSIS: Status: RESOLVED | Noted: 2023-11-03 | Resolved: 2024-02-05

## 2024-02-26 PROBLEM — R65.21 SEPTIC SHOCK: Status: RESOLVED | Noted: 2019-01-19 | Resolved: 2024-02-26

## 2024-02-26 PROBLEM — A41.9 SEPTIC SHOCK: Status: RESOLVED | Noted: 2019-01-19 | Resolved: 2024-02-26

## 2024-08-06 ENCOUNTER — OFFICE VISIT (OUTPATIENT)
Dept: SURGERY | Facility: CLINIC | Age: 44
End: 2024-08-06
Payer: MEDICARE

## 2024-08-06 VITALS — WEIGHT: 180 LBS | HEIGHT: 67 IN | BODY MASS INDEX: 28.25 KG/M2

## 2024-08-06 DIAGNOSIS — T82.9XXA COMPLICATION ASSOCIATED WITH DIALYSIS CATHETER: ICD-10-CM

## 2024-08-06 DIAGNOSIS — N18.6 ESRD ON DIALYSIS: ICD-10-CM

## 2024-08-06 DIAGNOSIS — K42.9 UMBILICAL HERNIA WITHOUT OBSTRUCTION AND WITHOUT GANGRENE: Primary | ICD-10-CM

## 2024-08-06 DIAGNOSIS — Z01.818 PRE-PROCEDURAL EXAMINATION: ICD-10-CM

## 2024-08-06 DIAGNOSIS — Z99.2 ESRD ON DIALYSIS: ICD-10-CM

## 2024-08-06 PROCEDURE — 99999 PR PBB SHADOW E&M-EST. PATIENT-LVL V: CPT | Mod: PBBFAC,,, | Performed by: STUDENT IN AN ORGANIZED HEALTH CARE EDUCATION/TRAINING PROGRAM

## 2024-08-06 PROCEDURE — 99214 OFFICE O/P EST MOD 30 MIN: CPT | Mod: S$PBB,,, | Performed by: STUDENT IN AN ORGANIZED HEALTH CARE EDUCATION/TRAINING PROGRAM

## 2024-08-06 PROCEDURE — 3008F BODY MASS INDEX DOCD: CPT | Mod: CPTII,,, | Performed by: STUDENT IN AN ORGANIZED HEALTH CARE EDUCATION/TRAINING PROGRAM

## 2024-08-06 PROCEDURE — 99215 OFFICE O/P EST HI 40 MIN: CPT | Mod: PBBFAC | Performed by: STUDENT IN AN ORGANIZED HEALTH CARE EDUCATION/TRAINING PROGRAM

## 2024-08-06 PROCEDURE — 1159F MED LIST DOCD IN RCRD: CPT | Mod: CPTII,,, | Performed by: STUDENT IN AN ORGANIZED HEALTH CARE EDUCATION/TRAINING PROGRAM

## 2024-08-07 RX ORDER — CLINDAMYCIN PHOSPHATE 900 MG/50ML
900 INJECTION, SOLUTION INTRAVENOUS
OUTPATIENT
Start: 2024-08-07

## 2024-08-07 RX ORDER — SODIUM CHLORIDE 9 MG/ML
INJECTION, SOLUTION INTRAVENOUS CONTINUOUS
OUTPATIENT
Start: 2024-08-07

## 2024-08-07 NOTE — H&P (VIEW-ONLY)
Subjective     Patient ID: Marian Tabares is a 43 y.o. female.    Chief Complaint: Follow-up    43-year-old  female presents to the clinic for evaluation for malfunctioning dialysis catheter.  Patient had a tunneled dialysis catheter placed in the right femoral vein and a infected peritoneal dialysis catheter removed on November 4th.  Patient was discharged home to follow-up with myself in clinic.  Patient went to dialysis and they state she is having low flow rates in the catheter; patient states she gets flow rates of about 150 when sitting down; if the patient stands up, her flow rates are above 300, thus they are able to use the catheter somewhat.  Patient has a occluded bilateral internal jugular veins, and occluded left femoral vein.  Denies any chest pain/shortness of breath, nausea/vomiting/diarrhea, fever/chills.    12/5:  Patient doing well.  States that her flow rates dialysis saw around 200, but after administering Activase, flow rates go up to 300-350.  Patient does have a appointment with Dr. Mathis's with Interventional Radiology for possible catheter exchange.  Patient will discuss with Nephrology regarding if she can continue peritoneal dialysis.  She was 1 month out from having positive cultures with yeast in her peritoneal fluid.  Patient completing Diflucan in oral antibiotics over the next week.  Denies any chest pain/shortness of breath, nausea/vomiting/diarrhea, fever/chills.    8/6:  Patient doing well.  She has been dialyzing from her right internal jugular tunneled hemodialysis catheter that was placed by interventional radiology with no issue; her previous catheter placed in the right groin and tunneled to the right side of her abdomen has been removed and site healed.  Patient asking to see if she was a candidate for peritoneal dialysis again.  She had had an infected catheter removed previously.  Patient does have a large umbilical hernia that she states would swell and  get larger whenever she was doing her peritoneal dialysis and was causing her pain.  Denies any chest pain/shortness of breath, nausea/vomiting/diarrhea, fever/chills.      Review of Systems   Constitutional: Negative.    HENT: Negative.     Eyes: Negative.    Respiratory:  Negative for shortness of breath.    Cardiovascular:  Negative for chest pain.   Gastrointestinal:  Negative for abdominal distention, abdominal pain, blood in stool and change in bowel habit.   Genitourinary: Negative.  Negative for hematuria.   Musculoskeletal:  Negative for myalgias.   Neurological:  Negative for dizziness and weakness.   Psychiatric/Behavioral: Negative.            Objective     Physical Exam  Constitutional:       General: She is not in acute distress.     Appearance: Normal appearance.   HENT:      Head: Normocephalic.   Cardiovascular:      Rate and Rhythm: Normal rate.   Pulmonary:      Effort: Pulmonary effort is normal. No respiratory distress.   Abdominal:      General: There is no distension.      Tenderness: There is no abdominal tenderness.          Comments: Multiple healed incisions across the abdomen.      3 cm umbilical hernia that has reducible; positive tenderness to palpation   Musculoskeletal:         General: Normal range of motion.   Skin:     General: Skin is warm.      Coloration: Skin is not jaundiced.   Neurological:      General: No focal deficit present.      Mental Status: She is alert and oriented to person, place, and time.      Cranial Nerves: No cranial nerve deficit.            Assessment and Plan     1. Pre-procedural examination  -     CBC Auto Differential; Future; Expected date: 08/06/2024  -     Basic Metabolic Panel; Future; Expected date: 08/06/2024  -     EKG 12-lead; Future    2. ESRD on dialysis    3. Complication associated with dialysis catheter        After discussing in detail with the patient, we will proceed with a robotic ventral hernia repair to repair the umbilical hernia.  A  proximally 8 weeks after she was recovered from this, we will then set the patient up for peritoneal dialysis catheter insertion.    Risks and benefits explained with the patient including risks for infection, bleeding, injury to surrounding structures, hematoma/seroma formation with need for possible evacuation, possible open.  The patient verbalized understanding, agrees and wishes to proceed with surgery.    Patient will need a CBC, BMP, EKG prior to surgery    Greater than 4 Mets; denies any exertional chest pain or shortness of breath       No follow-ups on file.

## 2024-08-19 ENCOUNTER — TELEPHONE (OUTPATIENT)
Dept: SURGERY | Facility: CLINIC | Age: 44
End: 2024-08-19
Payer: MEDICARE

## 2024-08-19 NOTE — TELEPHONE ENCOUNTER
----- Message from Constance Cobian sent at 8/19/2024  9:40 AM CDT -----  Regarding: pre op lab  Who Called: Marian Tabares    Patient is returning phone call    Who Left Message for Patient:Marian Tabares  Does the patient know what this is regarding?:Missed getting labs before surgery wants to know if it is to late to get them       Preferred Method of Contact: Phone Call  Patient's Preferred Phone Number on File: [phone]   Best Call Back Number, if different: [phone]  Additional Information: If she doesn't answer just leave a message with all the information    No answer, left v/m.

## 2024-08-19 NOTE — TELEPHONE ENCOUNTER
----- Message from Dena De La Garza sent at 8/19/2024 10:46 AM CDT -----  Regarding: Pt. called about EKG  Who Called: Marian Tabares    Caller is requesting assistance/information from provider's office.      Preferred Method of Contact: Phone Call  Patient's Preferred Phone Number on File: [phone]   Best Call Back Number, if different:  Additional Information: Pt. needs to know what's the latest she can come in for her EKG. She said a nurse called and told her she needs to get it done today or dalia. but did not give her a time. I see she haves a procedure Wed. She wants a call back with more information. She said she is at work, but someone could leave her a VM.    Tried calling pt again, no answer, left v/m.

## 2024-08-20 ENCOUNTER — CLINICAL SUPPORT (OUTPATIENT)
Dept: CARDIOLOGY | Facility: CLINIC | Age: 44
End: 2024-08-20
Payer: MEDICARE

## 2024-08-20 ENCOUNTER — TELEPHONE (OUTPATIENT)
Dept: SURGERY | Facility: CLINIC | Age: 44
End: 2024-08-20
Payer: MEDICARE

## 2024-08-20 DIAGNOSIS — Z01.818 PRE-PROCEDURAL EXAMINATION: ICD-10-CM

## 2024-08-20 PROCEDURE — 99212 OFFICE O/P EST SF 10 MIN: CPT | Mod: PBBFAC,25

## 2024-08-20 PROCEDURE — 93010 ELECTROCARDIOGRAM REPORT: CPT | Mod: S$PBB,,, | Performed by: HOSPITALIST

## 2024-08-20 PROCEDURE — 99999 PR PBB SHADOW E&M-EST. PATIENT-LVL II: CPT | Mod: PBBFAC,,,

## 2024-08-20 PROCEDURE — 93005 ELECTROCARDIOGRAM TRACING: CPT | Mod: PBBFAC | Performed by: HOSPITALIST

## 2024-08-20 NOTE — TELEPHONE ENCOUNTER
----- Message from Constance Cobian sent at 8/20/2024  9:02 AM CDT -----  Regarding: labs  Who Called: Marian Tabares    Caller is requesting assistance/information from provider's office.    Symptoms (please be specific): can she come to get labs or wait until later?         Preferred Method of Contact: Phone Call  Patient's Preferred Phone Number on File: [phone]   Best Call Back Number, if different: [phone]  Additional Information:    Spoke with pt, states she is coming to get her labs and EKG around 1400 today.

## 2024-08-21 ENCOUNTER — ANESTHESIA EVENT (OUTPATIENT)
Dept: SURGERY | Facility: HOSPITAL | Age: 44
End: 2024-08-21
Payer: MEDICARE

## 2024-08-21 ENCOUNTER — HOSPITAL ENCOUNTER (OUTPATIENT)
Facility: HOSPITAL | Age: 44
Discharge: HOME | End: 2024-08-21
Attending: STUDENT IN AN ORGANIZED HEALTH CARE EDUCATION/TRAINING PROGRAM | Admitting: STUDENT IN AN ORGANIZED HEALTH CARE EDUCATION/TRAINING PROGRAM
Payer: MEDICARE

## 2024-08-21 ENCOUNTER — ANESTHESIA (OUTPATIENT)
Dept: SURGERY | Facility: HOSPITAL | Age: 44
End: 2024-08-21
Payer: MEDICARE

## 2024-08-21 VITALS
RESPIRATION RATE: 15 BRPM | HEIGHT: 67 IN | WEIGHT: 172 LBS | DIASTOLIC BLOOD PRESSURE: 93 MMHG | HEART RATE: 69 BPM | SYSTOLIC BLOOD PRESSURE: 166 MMHG | OXYGEN SATURATION: 95 % | TEMPERATURE: 98 F | BODY MASS INDEX: 27 KG/M2

## 2024-08-21 DIAGNOSIS — K42.9 UMBILICAL HERNIA WITHOUT OBSTRUCTION AND WITHOUT GANGRENE: Primary | ICD-10-CM

## 2024-08-21 DIAGNOSIS — Z01.818 PRE-PROCEDURAL EXAMINATION: ICD-10-CM

## 2024-08-21 LAB
HCG SERUM QUALITATIVE: NEGATIVE
HCO3 UR-SCNC: 28.9 MMOL/L (ref 24–28)
HCT VFR BLD CALC: 37 % (ref 35–51)
LDH SERPL L TO P-CCNC: 0.6 MMOL/L (ref 0.3–1.2)
PCO2 BLDA: 50 MMHG (ref 41–51)
PH SMN: 7.37 [PH] (ref 7.32–7.42)
PO2 BLDA: 42 MMHG (ref 25–40)
POC BASE EXCESS: 2.8 MMOL/L (ref -2–3)
POC CO2: 30.4 MMOL/L
POC IONIZED CALCIUM: 1.16 MMOL/L (ref 1.15–1.35)
POC SATURATED O2: 76 % (ref 40–70)
POCT GLUCOSE: 72 MG/DL (ref 60–95)
POTASSIUM BLD-SCNC: 4.9 MMOL/L (ref 3.4–4.5)
SODIUM BLD-SCNC: 134 MMOL/L (ref 136–145)

## 2024-08-21 PROCEDURE — 36000711: Performed by: STUDENT IN AN ORGANIZED HEALTH CARE EDUCATION/TRAINING PROGRAM

## 2024-08-21 PROCEDURE — 82803 BLOOD GASES ANY COMBINATION: CPT

## 2024-08-21 PROCEDURE — 82947 ASSAY GLUCOSE BLOOD QUANT: CPT

## 2024-08-21 PROCEDURE — 63600175 PHARM REV CODE 636 W HCPCS: Mod: JZ,JG | Performed by: STUDENT IN AN ORGANIZED HEALTH CARE EDUCATION/TRAINING PROGRAM

## 2024-08-21 PROCEDURE — 84132 ASSAY OF SERUM POTASSIUM: CPT

## 2024-08-21 PROCEDURE — 25000003 PHARM REV CODE 250: Performed by: NURSE ANESTHETIST, CERTIFIED REGISTERED

## 2024-08-21 PROCEDURE — 27000509 HC TUBE SALEM SUMP ANY SIZE: Performed by: ANESTHESIOLOGY

## 2024-08-21 PROCEDURE — 82330 ASSAY OF CALCIUM: CPT

## 2024-08-21 PROCEDURE — 63600175 PHARM REV CODE 636 W HCPCS: Performed by: NURSE ANESTHETIST, CERTIFIED REGISTERED

## 2024-08-21 PROCEDURE — 37000008 HC ANESTHESIA 1ST 15 MINUTES: Performed by: STUDENT IN AN ORGANIZED HEALTH CARE EDUCATION/TRAINING PROGRAM

## 2024-08-21 PROCEDURE — 36415 COLL VENOUS BLD VENIPUNCTURE: CPT | Performed by: STUDENT IN AN ORGANIZED HEALTH CARE EDUCATION/TRAINING PROGRAM

## 2024-08-21 PROCEDURE — 27000716 HC OXISENSOR PROBE, ANY SIZE: Performed by: ANESTHESIOLOGY

## 2024-08-21 PROCEDURE — 27000655: Performed by: ANESTHESIOLOGY

## 2024-08-21 PROCEDURE — 25000003 PHARM REV CODE 250: Performed by: STUDENT IN AN ORGANIZED HEALTH CARE EDUCATION/TRAINING PROGRAM

## 2024-08-21 PROCEDURE — 85014 HEMATOCRIT: CPT

## 2024-08-21 PROCEDURE — 71000016 HC POSTOP RECOV ADDL HR: Performed by: STUDENT IN AN ORGANIZED HEALTH CARE EDUCATION/TRAINING PROGRAM

## 2024-08-21 PROCEDURE — 27000689 HC BLADE LARYNGOSCOPE ANY SIZE: Performed by: ANESTHESIOLOGY

## 2024-08-21 PROCEDURE — 37000009 HC ANESTHESIA EA ADD 15 MINS: Performed by: STUDENT IN AN ORGANIZED HEALTH CARE EDUCATION/TRAINING PROGRAM

## 2024-08-21 PROCEDURE — 27000165 HC TUBE, ETT CUFFED: Performed by: ANESTHESIOLOGY

## 2024-08-21 PROCEDURE — C1781 MESH (IMPLANTABLE): HCPCS | Performed by: STUDENT IN AN ORGANIZED HEALTH CARE EDUCATION/TRAINING PROGRAM

## 2024-08-21 PROCEDURE — 49593 RPR AA HRN 1ST 3-10 RDC: CPT | Mod: 22,,, | Performed by: STUDENT IN AN ORGANIZED HEALTH CARE EDUCATION/TRAINING PROGRAM

## 2024-08-21 PROCEDURE — 71000015 HC POSTOP RECOV 1ST HR: Performed by: STUDENT IN AN ORGANIZED HEALTH CARE EDUCATION/TRAINING PROGRAM

## 2024-08-21 PROCEDURE — 83605 ASSAY OF LACTIC ACID: CPT

## 2024-08-21 PROCEDURE — 27202344 HC EYESHIELD: Performed by: ANESTHESIOLOGY

## 2024-08-21 PROCEDURE — 63600175 PHARM REV CODE 636 W HCPCS: Performed by: ANESTHESIOLOGY

## 2024-08-21 PROCEDURE — 71000033 HC RECOVERY, INTIAL HOUR: Performed by: STUDENT IN AN ORGANIZED HEALTH CARE EDUCATION/TRAINING PROGRAM

## 2024-08-21 PROCEDURE — D9220A PRA ANESTHESIA: Mod: CRNA,,, | Performed by: NURSE ANESTHETIST, CERTIFIED REGISTERED

## 2024-08-21 PROCEDURE — 27201423 OPTIME MED/SURG SUP & DEVICES STERILE SUPPLY: Performed by: STUDENT IN AN ORGANIZED HEALTH CARE EDUCATION/TRAINING PROGRAM

## 2024-08-21 PROCEDURE — D9220A PRA ANESTHESIA: Mod: ANES,,, | Performed by: ANESTHESIOLOGY

## 2024-08-21 PROCEDURE — 27000510 HC BLANKET BAIR HUGGER ANY SIZE: Performed by: ANESTHESIOLOGY

## 2024-08-21 PROCEDURE — 36000710: Performed by: STUDENT IN AN ORGANIZED HEALTH CARE EDUCATION/TRAINING PROGRAM

## 2024-08-21 PROCEDURE — 84703 CHORIONIC GONADOTROPIN ASSAY: CPT | Performed by: STUDENT IN AN ORGANIZED HEALTH CARE EDUCATION/TRAINING PROGRAM

## 2024-08-21 PROCEDURE — 84295 ASSAY OF SERUM SODIUM: CPT

## 2024-08-21 DEVICE — COMPOSITE MESH,MONOFILAMENT POLYESTER WITH ABSORBABLE COLLAGEN FILM AND MARKING
Type: IMPLANTABLE DEVICE | Site: ABDOMEN | Status: FUNCTIONAL
Brand: SYMBOTEX

## 2024-08-21 RX ORDER — FENTANYL CITRATE 50 UG/ML
INJECTION, SOLUTION INTRAMUSCULAR; INTRAVENOUS
Status: DISCONTINUED | OUTPATIENT
Start: 2024-08-21 | End: 2024-08-21

## 2024-08-21 RX ORDER — ROCURONIUM BROMIDE 10 MG/ML
INJECTION, SOLUTION INTRAVENOUS
Status: DISCONTINUED | OUTPATIENT
Start: 2024-08-21 | End: 2024-08-21

## 2024-08-21 RX ORDER — CLINDAMYCIN PHOSPHATE 900 MG/50ML
900 INJECTION, SOLUTION INTRAVENOUS
Status: COMPLETED | OUTPATIENT
Start: 2024-08-21 | End: 2024-08-21

## 2024-08-21 RX ORDER — LIDOCAINE HYDROCHLORIDE 20 MG/ML
INJECTION INTRAVENOUS
Status: DISCONTINUED | OUTPATIENT
Start: 2024-08-21 | End: 2024-08-21

## 2024-08-21 RX ORDER — PHENYLEPHRINE HYDROCHLORIDE 10 MG/ML
INJECTION INTRAVENOUS
Status: DISCONTINUED | OUTPATIENT
Start: 2024-08-21 | End: 2024-08-21

## 2024-08-21 RX ORDER — MEPERIDINE HYDROCHLORIDE 25 MG/ML
25 INJECTION INTRAMUSCULAR; INTRAVENOUS; SUBCUTANEOUS EVERY 10 MIN PRN
Status: DISCONTINUED | OUTPATIENT
Start: 2024-08-21 | End: 2024-08-21 | Stop reason: HOSPADM

## 2024-08-21 RX ORDER — OXYCODONE AND ACETAMINOPHEN 5; 325 MG/1; MG/1
1 TABLET ORAL ONCE
Status: COMPLETED | OUTPATIENT
Start: 2024-08-21 | End: 2024-08-21

## 2024-08-21 RX ORDER — DOCUSATE SODIUM 100 MG/1
100 CAPSULE, LIQUID FILLED ORAL 2 TIMES DAILY
Qty: 28 CAPSULE | Refills: 0 | Status: SHIPPED | OUTPATIENT
Start: 2024-08-21

## 2024-08-21 RX ORDER — AMLODIPINE BESYLATE 10 MG/1
10 TABLET ORAL DAILY
COMMUNITY

## 2024-08-21 RX ORDER — ONDANSETRON HYDROCHLORIDE 2 MG/ML
4 INJECTION, SOLUTION INTRAVENOUS DAILY PRN
Status: DISCONTINUED | OUTPATIENT
Start: 2024-08-21 | End: 2024-08-21 | Stop reason: HOSPADM

## 2024-08-21 RX ORDER — PROPOFOL 10 MG/ML
VIAL (ML) INTRAVENOUS
Status: DISCONTINUED | OUTPATIENT
Start: 2024-08-21 | End: 2024-08-21

## 2024-08-21 RX ORDER — OXYCODONE AND ACETAMINOPHEN 10; 325 MG/1; MG/1
1 TABLET ORAL EVERY 6 HOURS PRN
Qty: 28 TABLET | Refills: 0 | Status: SHIPPED | OUTPATIENT
Start: 2024-08-21

## 2024-08-21 RX ORDER — HYDROMORPHONE HYDROCHLORIDE 2 MG/ML
0.5 INJECTION, SOLUTION INTRAMUSCULAR; INTRAVENOUS; SUBCUTANEOUS EVERY 5 MIN PRN
Status: DISCONTINUED | OUTPATIENT
Start: 2024-08-21 | End: 2024-08-21 | Stop reason: HOSPADM

## 2024-08-21 RX ORDER — MORPHINE SULFATE 10 MG/ML
4 INJECTION INTRAMUSCULAR; INTRAVENOUS; SUBCUTANEOUS EVERY 5 MIN PRN
Status: DISCONTINUED | OUTPATIENT
Start: 2024-08-21 | End: 2024-08-21 | Stop reason: HOSPADM

## 2024-08-21 RX ORDER — SODIUM CHLORIDE 9 MG/ML
INJECTION, SOLUTION INTRAVENOUS CONTINUOUS
Status: DISCONTINUED | OUTPATIENT
Start: 2024-08-21 | End: 2024-08-21 | Stop reason: HOSPADM

## 2024-08-21 RX ORDER — ONDANSETRON HYDROCHLORIDE 2 MG/ML
INJECTION, SOLUTION INTRAVENOUS
Status: DISCONTINUED | OUTPATIENT
Start: 2024-08-21 | End: 2024-08-21

## 2024-08-21 RX ORDER — IPRATROPIUM BROMIDE AND ALBUTEROL SULFATE 2.5; .5 MG/3ML; MG/3ML
3 SOLUTION RESPIRATORY (INHALATION) ONCE
Status: DISCONTINUED | OUTPATIENT
Start: 2024-08-21 | End: 2024-08-21 | Stop reason: HOSPADM

## 2024-08-21 RX ORDER — DIPHENHYDRAMINE HYDROCHLORIDE 50 MG/ML
25 INJECTION INTRAMUSCULAR; INTRAVENOUS EVERY 6 HOURS PRN
Status: DISCONTINUED | OUTPATIENT
Start: 2024-08-21 | End: 2024-08-21 | Stop reason: HOSPADM

## 2024-08-21 RX ORDER — DEXAMETHASONE SODIUM PHOSPHATE 4 MG/ML
INJECTION, SOLUTION INTRA-ARTICULAR; INTRALESIONAL; INTRAMUSCULAR; INTRAVENOUS; SOFT TISSUE
Status: DISCONTINUED | OUTPATIENT
Start: 2024-08-21 | End: 2024-08-21

## 2024-08-21 RX ADMIN — PHENYLEPHRINE HYDROCHLORIDE 200 MCG: 10 INJECTION INTRAVENOUS at 08:08

## 2024-08-21 RX ADMIN — CLINDAMYCIN IN 5 PERCENT DEXTROSE 900 MG: 18 INJECTION, SOLUTION INTRAVENOUS at 08:08

## 2024-08-21 RX ADMIN — ONDANSETRON 4 MG: 2 INJECTION INTRAMUSCULAR; INTRAVENOUS at 07:08

## 2024-08-21 RX ADMIN — ROCURONIUM BROMIDE 50 MG: 10 INJECTION, SOLUTION INTRAVENOUS at 07:08

## 2024-08-21 RX ADMIN — DEXAMETHASONE SODIUM PHOSPHATE 8 MG: 4 INJECTION, SOLUTION INTRA-ARTICULAR; INTRALESIONAL; INTRAMUSCULAR; INTRAVENOUS; SOFT TISSUE at 07:08

## 2024-08-21 RX ADMIN — SUGAMMADEX 200 MG: 100 INJECTION, SOLUTION INTRAVENOUS at 10:08

## 2024-08-21 RX ADMIN — HYDROMORPHONE HYDROCHLORIDE 0.5 MG: 2 INJECTION INTRAMUSCULAR; INTRAVENOUS; SUBCUTANEOUS at 11:08

## 2024-08-21 RX ADMIN — PHENYLEPHRINE HYDROCHLORIDE 200 MCG: 10 INJECTION INTRAVENOUS at 07:08

## 2024-08-21 RX ADMIN — FENTANYL CITRATE 100 MCG: 50 INJECTION, SOLUTION INTRAMUSCULAR; INTRAVENOUS at 07:08

## 2024-08-21 RX ADMIN — SODIUM CHLORIDE: 9 INJECTION, SOLUTION INTRAVENOUS at 06:08

## 2024-08-21 RX ADMIN — PHENYLEPHRINE HYDROCHLORIDE 100 MCG: 10 INJECTION INTRAVENOUS at 09:08

## 2024-08-21 RX ADMIN — OXYCODONE HYDROCHLORIDE AND ACETAMINOPHEN 1 TABLET: 5; 325 TABLET ORAL at 01:08

## 2024-08-21 RX ADMIN — HYDROMORPHONE HYDROCHLORIDE 0.5 MG: 2 INJECTION INTRAMUSCULAR; INTRAVENOUS; SUBCUTANEOUS at 10:08

## 2024-08-21 RX ADMIN — PROPOFOL 140 MG: 10 INJECTION, EMULSION INTRAVENOUS at 07:08

## 2024-08-21 RX ADMIN — PHENYLEPHRINE HYDROCHLORIDE 200 MCG: 10 INJECTION INTRAVENOUS at 09:08

## 2024-08-21 RX ADMIN — LIDOCAINE HYDROCHLORIDE 100 MG: 20 INJECTION, SOLUTION INTRAVENOUS at 07:08

## 2024-08-21 NOTE — OP NOTE
[facility] - Periop Services  Surgery Department  Operative Note    SUMMARY     Date of Procedure: 8/21/2024     Procedure: Procedure(s) (LRB):  XI ROBOTIC REPAIR, HERNIA, VENTRAL (N/A)  XI ROBOTIC SIGNIFICANT LYSIS, ADHESIONS (N/A)     Surgeons and Role:     * Jules Álvarez DO - Primary    Assisting Surgeon: None    Pre-Operative Diagnosis: Umbilical hernia without obstruction and without gangrene [K42.9]    Post-Operative Diagnosis: Post-Op Diagnosis Codes:     * Umbilical hernia without obstruction and without gangrene [K42.9]    Anesthesia: General    Operative Findings (including complications, if any): 3 cm umbilical hernia defect containing fat and omentum; significant small bowel and omental adhesions across the entirety of the abdomen    Description of Technical Procedures: Patient was brought to the operating room and placed on the operative table in supine position.  Patient underwent general anesthesia per the anesthesia team.  The abdomen was then prepped and draped in usual sterile fashion.  A stab incision was made in the left upper quadrant with a #15 Blade scalpel and a Veress needle was inserted.  Abdomen was insufflated to 15 mm Hg; patient tolerated insufflation well.  An 8 mm trocar was inserted in the abdomen under visualization with the laparoscope; no injuries identified.  Two additional 8 mm trocars were placed in the left lateral and left lower quadrant under visualization.  At this time we noticed a paraumbilical hernia containing omentum and fat. The robot was then brought in and docked and we reduced the hernia contents.  There were significant omental and small-bowel adhesions across the abdomen; we spent 60 minutes and performed a significant lysis of adhesions; I will add a 22 modifier to the case.  The defect was 3 cm fascial defect.  At this time we inserted a 0 V lock suture and approximated the defect in a running fashion.  We then inserted a 15 cm Symbotex mesh and  one 2-0 double arm stratafix suture and a 3-0 v-lock suture and fixated the mesh to the abdominal wall with the 0 V lock needle.  We fixated the mesh with the sutures in a running fashion circumferentially to the peritoneum.  We then removed all needles under visualization.  The robot was then undocked.  A Ray-Ilene sponge has been inserted; this was removed.  We used a suction  and irrigated the abdomen and suctioned clear.   All remaining trocars were removed and pneumoperitoneum was released.  The abdomen was cleaned and dried, incisions were approximated with a 4-0 Monocryl running subcuticular stitch.  Mastisol Steri-Strips and umbilical dressing applied.   Patient was awakened and taken to PACU in stable condition    Specimens:  None        Complications:  None         Estimated Blood Loss (EBL): 25cc           Implants:   Implant Name Type Inv. Item Serial No.  Lot No. LRB No. Used Action   MESH SYMBOTEX COMP [id number] - [id number]  MESH SYMBOTEX COMP [id number]  [employer] [facility] [id number] N/A 1 Implanted       Specimens:   Specimen (24h ago, onward)      None                    Condition: Good    Disposition: PACU - hemodynamically stable.    Attestation: Op Note Attestation: I was physically present and scrubbed for the entire procedure.

## 2024-08-21 NOTE — ANESTHESIA PROCEDURE NOTES
Intubation    Date/Time: 8/21/2024 7:47 AM    Performed by: Juan Wheat CRNA  Authorized by: Kael Roach MD    Intubation:     Induction:  Intravenous    Intubated:  Postinduction    Mask Ventilation:  Easy mask    Attempts:  1    Attempted By:  CRNA    Blade:  Diane 3    Laryngeal View Grade: Grade I - full view of cords      Difficult Airway Encountered?: No      Complications:  None    Airway Device:  Oral endotracheal tube    Airway Device Size:  7.0    Style/Cuff Inflation:  Cuffed (inflated to minimal occlusive pressure)    Inflation Amount (mL):  8    Tube secured:  21    Secured at:  The teeth    Placement Verified By:  Capnometry    Complicating Factors:  None    Findings Post-Intubation:  BS equal bilateral and atraumatic/condition of teeth unchanged

## 2024-08-21 NOTE — OR NURSING
1032 REC'D TO PACU IN STABLE CONDITION. VSS. SATS 100% ON 6L/FM. WILL TITRATE O2 ATOL. DENIES PAIN AT THIS TIME. DSG TO ABD x4 C/D/I. WILL CONT TO MONITOR.    1125 TRANSFERRED TO ASC #4 IN STABLE CONDITION. REPORT GIVEN TO [facility] NEAL. /76 HR 63 RR 14 SATS 98% ON RA.

## 2024-08-21 NOTE — ANESTHESIA PREPROCEDURE EVALUATION
08/21/2024  Marian Tabares is a 44 y.o., female.      Pre-op Assessment    I have reviewed the Patient Summary Reports.     I have reviewed the Nursing Notes. I have reviewed the NPO Status.   I have reviewed the Medications.     Review of Systems  Anesthesia Hx:             Denies Family Hx of Anesthesia complications.    Denies Personal Hx of Anesthesia complications.                    Social:  No Alcohol Use, Smoker       Hematology/Oncology:                   Hematology Comments: HIV                    Cardiovascular:     Hypertension                     Shortness of Breath                    Hypertension         Pulmonary:      Shortness of breath                  Renal/:  Chronic Renal Disease, Dialysis        Kidney Function/Disease             Hepatic/GI:     GERD      Gerd          Musculoskeletal:  Musculoskeletal Normal                Endocrine:   Hypothyroidism       Hypothyroidism          Psych:  Psychiatric History                  Physical Exam  General: Well nourished, Cooperative, Alert and Oriented    Airway:  Mallampati: II / II  Mouth Opening: Normal  TM Distance: Normal  Neck ROM: Normal ROM    Dental:  Intact    Chest/Lungs:  Clear to auscultation    Heart:  Rate: Normal  Rhythm: Regular Rhythm  Sounds: Normal        Chemistry        Component Value Date/Time     08/20/2024 1008    K 5.9 (H) 08/20/2024 1008     08/20/2024 1008    CO2 24 08/20/2024 1008    BUN 63 (H) 08/20/2024 1008    BUN 10.0 05/16/2022 1104    CREATININE 13.00 (H) 08/20/2024 1008    GLU 87 08/20/2024 1008        Component Value Date/Time    CALCIUM 10.1 08/20/2024 1008    ALKPHOS 58 11/02/2023 2038    AST 8 (L) 11/02/2023 2038    ALT 10 (L) 11/02/2023 2038    BILITOT 0.3 11/02/2023 2038    EGFRNONAA 3 (L) 05/02/2022 2050        Lab Results   Component Value Date    WBC 4.01 (L) 08/20/2024    HGB 10.3  (L) 08/20/2024    HCT 35.9 (L) 08/20/2024     08/20/2024     Results for orders placed or performed during the hospital encounter of 05/02/22   EKG 12-lead    Collection Time: 05/02/22  9:45 PM    Narrative    Test Reason : E87.6,    Vent. Rate : 064 BPM     Atrial Rate : 064 BPM     P-R Int : 160 ms          QRS Dur : 090 ms      QT Int : 398 ms       P-R-T Axes : 066 057 062 degrees     QTc Int : 410 ms    Sinus rhythm with occasional Premature ventricular complexes  Low voltage QRS  T wave abnormality, consider inferior ischemia  Abnormal ECG  No previous ECGs available  Confirmed by Donnell Jones DO ([id number]) on 5/2/2022 10:36:24 PM    Referred By: RONALD   SELF           Confirmed By:Donnell Jones DO         Anesthesia Plan  Type of Anesthesia, risks & benefits discussed:    Anesthesia Type: Gen ETT  Intra-op Monitoring Plan: Standard ASA Monitors  Post Op Pain Control Plan: multimodal analgesia  Induction:  IV  Airway Plan: Direct  Informed Consent: Informed consent signed with the Patient and all parties understand the risks and agree with anesthesia plan.  All questions answered.   ASA Score: 3  Day of Surgery Review of History & Physical: H&P Update referred to the surgeon/provider.I have interviewed and examined the patient. I have reviewed the patient's H&P dated: There are no significant changes.     Ready For Surgery From Anesthesia Perspective.     .

## 2024-08-21 NOTE — TRANSFER OF CARE
"Anesthesia Transfer of Care Note    Patient: Marian Tabares    Procedure(s) Performed: Procedure(s) (LRB):  XI ROBOTIC REPAIR, HERNIA, VENTRAL (N/A)  XI ROBOTIC LYSIS, ADHESIONS (N/A)    Patient location: PACU    Anesthesia Type: general    Transport from OR: Transported from OR on 6-10 L/min O2 by face mask with adequate spontaneous ventilation    Post pain: adequate analgesia    Post assessment: no apparent anesthetic complications    Post vital signs: stable    Level of consciousness: awake and alert    Nausea/Vomiting: no nausea/vomiting    Complications: none    Transfer of care protocol was followed      Last vitals: Visit Vitals  BP (!) 158/92 (BP Location: Left arm, Patient Position: Lying)   Pulse 68   Temp 36.5 °C (97.7 °F) (Oral)   Resp 17   Ht 5' 7" (1.702 m)   Wt 78 kg (172 lb)   LMP 07/22/2024   SpO2 100%   Breastfeeding No   BMI 26.94 kg/m²     "

## 2024-08-21 NOTE — BRIEF OP NOTE
[facility] - Periop Services  Brief Operative Note    Surgery Date: 8/21/2024     Surgeons and Role:     * Jules Álvarez DO - Primary    Assisting Surgeon: None    Pre-op Diagnosis:  Umbilical hernia without obstruction and without gangrene [K42.9]    Post-op Diagnosis:  Post-Op Diagnosis Codes:     * Umbilical hernia without obstruction and without gangrene [K42.9]    Procedure(s) (LRB):  XI ROBOTIC REPAIR, HERNIA, VENTRAL (N/A)  XI ROBOTIC LYSIS, ADHESIONS (N/A)    Anesthesia: General    Operative Findings:  3 cm umbilical hernia defect containing fat and omentum; significant small bowel and omental adhesions across the entirety of the abdomen    Estimated Blood Loss: 25cc         Specimens:   Specimen (24h ago, onward)      None              Discharge Note    OUTCOME: Patient tolerated treatment/procedure well without complication and is now ready for discharge.    DISPOSITION: Home or Self Care    FINAL DIAGNOSIS:  Umbilical hernia without obstruction and without gangrene    FOLLOWUP: In clinic    DISCHARGE INSTRUCTIONS:    Discharge Procedure Orders   Diet renal     Lifting restrictions   Order Comments: No lifting over 20 lb the next 2 weeks      Remove dressing in 72 hours   Order Comments: Ok to shower tomorrow. Allow soap/water to rinse over wounds. Remove bandaids in 72 hours.  Leave strips in place. Do not scrub glue/strips off; will remove in clinic in 2 weeks. No tub baths or swimming.  Follow up in clinic in 2 weeks.  Keep umbilical dressing in place until seen in clinic     Activity as tolerated

## 2024-08-24 NOTE — ANESTHESIA POSTPROCEDURE EVALUATION
Anesthesia Post Evaluation    Patient: Marian Tabares    Procedure(s) Performed: Procedure(s) (LRB):  XI ROBOTIC REPAIR, HERNIA, VENTRAL (N/A)  XI ROBOTIC LYSIS, ADHESIONS (N/A)    Final Anesthesia Type: general      Patient location during evaluation: PACU  Patient participation: Yes- Able to Participate  Level of consciousness: awake and alert  Post-procedure vital signs: reviewed and stable  Pain management: adequate  Airway patency: patent  NUHA mitigation strategies: Multimodal analgesia  PONV status at discharge: No PONV  Anesthetic complications: no      Cardiovascular status: blood pressure returned to baseline  Respiratory status: unassisted  Hydration status: euvolemic  Follow-up not needed.              Vitals Value Taken Time   /93 08/21/24 1331   Temp 36.5 °C (97.7 °F) 08/21/24 1037   Pulse 69 08/21/24 1335   Resp 15 08/21/24 1330   SpO2 95 % 08/21/24 1335   Vitals shown include unfiled device data.      Event Time   Out of Recovery 11:20:00         Pain/Yoandy Score: No data recorded

## 2024-08-26 LAB
OHS QRS DURATION: 74 MS
OHS QTC CALCULATION: 420 MS

## 2024-09-03 ENCOUNTER — OFFICE VISIT (OUTPATIENT)
Dept: SURGERY | Facility: CLINIC | Age: 44
End: 2024-09-03
Payer: MEDICARE

## 2024-09-03 VITALS — WEIGHT: 171.94 LBS | BODY MASS INDEX: 26.99 KG/M2 | HEIGHT: 67 IN

## 2024-09-03 DIAGNOSIS — K42.9 UMBILICAL HERNIA WITHOUT OBSTRUCTION AND WITHOUT GANGRENE: Primary | ICD-10-CM

## 2024-09-03 PROCEDURE — 99999 PR PBB SHADOW E&M-EST. PATIENT-LVL III: CPT | Mod: PBBFAC,,, | Performed by: STUDENT IN AN ORGANIZED HEALTH CARE EDUCATION/TRAINING PROGRAM

## 2024-09-03 PROCEDURE — 99213 OFFICE O/P EST LOW 20 MIN: CPT | Mod: PBBFAC | Performed by: STUDENT IN AN ORGANIZED HEALTH CARE EDUCATION/TRAINING PROGRAM

## 2024-09-03 RX ORDER — ONDANSETRON 4 MG/1
4 TABLET, ORALLY DISINTEGRATING ORAL EVERY 6 HOURS PRN
Qty: 30 TABLET | Refills: 2 | Status: SHIPPED | OUTPATIENT
Start: 2024-09-03 | End: 2024-10-03

## 2024-09-03 NOTE — PROGRESS NOTES
44-year-old  female presents the clinic for postop evaluation status post robot assisted ventral hernia repair.  Patient doing well, pain resolved.  Patient states he has been tolerating diet but gets occasional nausea.  This is chronic and she states she normally gets Zofran when she was at dialysis.  Denies any chest pain or shortness of breath, fevers or chills.    Abdominal incisions clean dry and intact is healing well, no signs of infection or drainage.      Patient does want to have a peritoneal dialysis catheter placed, but wants to give several months before she does this.  Patient states she will contact our office when she has decided on when she wants the catheter; currently dialyzing through a right internal jugular tunneled hemodialysis catheter.

## 2025-06-21 ENCOUNTER — HOSPITAL ENCOUNTER (EMERGENCY)
Facility: HOSPITAL | Age: 45
Discharge: HOME | End: 2025-06-21
Attending: EMERGENCY MEDICINE
Payer: MEDICARE

## 2025-06-21 VITALS
RESPIRATION RATE: 16 BRPM | DIASTOLIC BLOOD PRESSURE: 99 MMHG | BODY MASS INDEX: 27.96 KG/M2 | TEMPERATURE: 98 F | SYSTOLIC BLOOD PRESSURE: 189 MMHG | WEIGHT: 178.13 LBS | HEIGHT: 67 IN | HEART RATE: 67 BPM | OXYGEN SATURATION: 100 %

## 2025-06-21 DIAGNOSIS — N18.6 END-STAGE RENAL DISEASE ON HEMODIALYSIS: ICD-10-CM

## 2025-06-21 DIAGNOSIS — Z99.2 END-STAGE RENAL DISEASE ON HEMODIALYSIS: ICD-10-CM

## 2025-06-21 DIAGNOSIS — I10 UNCONTROLLED HYPERTENSION: Primary | ICD-10-CM

## 2025-06-21 DIAGNOSIS — G44.1 OTHER VASCULAR HEADACHE: ICD-10-CM

## 2025-06-21 PROCEDURE — 63600175 PHARM REV CODE 636 W HCPCS: Performed by: EMERGENCY MEDICINE

## 2025-06-21 PROCEDURE — 99285 EMERGENCY DEPT VISIT HI MDM: CPT | Mod: ,,, | Performed by: EMERGENCY MEDICINE

## 2025-06-21 PROCEDURE — 96374 THER/PROPH/DIAG INJ IV PUSH: CPT

## 2025-06-21 PROCEDURE — 96375 TX/PRO/DX INJ NEW DRUG ADDON: CPT

## 2025-06-21 PROCEDURE — 25000003 PHARM REV CODE 250: Performed by: EMERGENCY MEDICINE

## 2025-06-21 PROCEDURE — 99284 EMERGENCY DEPT VISIT MOD MDM: CPT | Mod: 25

## 2025-06-21 RX ORDER — CLONIDINE HYDROCHLORIDE 0.1 MG/1
0.3 TABLET ORAL
Status: COMPLETED | OUTPATIENT
Start: 2025-06-21 | End: 2025-06-21

## 2025-06-21 RX ORDER — MORPHINE SULFATE 2 MG/ML
2 INJECTION, SOLUTION INTRAMUSCULAR; INTRAVENOUS
Status: COMPLETED | OUTPATIENT
Start: 2025-06-21 | End: 2025-06-21

## 2025-06-21 RX ORDER — ACETAMINOPHEN 500 MG
1000 TABLET ORAL
Status: COMPLETED | OUTPATIENT
Start: 2025-06-21 | End: 2025-06-21

## 2025-06-21 RX ORDER — PROCHLORPERAZINE EDISYLATE 5 MG/ML
5 INJECTION INTRAMUSCULAR; INTRAVENOUS
Status: COMPLETED | OUTPATIENT
Start: 2025-06-21 | End: 2025-06-21

## 2025-06-21 RX ORDER — HYDRALAZINE HYDROCHLORIDE 20 MG/ML
20 INJECTION INTRAMUSCULAR; INTRAVENOUS
Status: COMPLETED | OUTPATIENT
Start: 2025-06-21 | End: 2025-06-21

## 2025-06-21 RX ADMIN — PROCHLORPERAZINE EDISYLATE 5 MG: 5 INJECTION INTRAMUSCULAR; INTRAVENOUS at 05:06

## 2025-06-21 RX ADMIN — HYDRALAZINE HYDROCHLORIDE 20 MG: 20 INJECTION INTRAMUSCULAR; INTRAVENOUS at 04:06

## 2025-06-21 RX ADMIN — CLONIDINE HYDROCHLORIDE 0.3 MG: 0.1 TABLET ORAL at 04:06

## 2025-06-21 RX ADMIN — ACETAMINOPHEN 1000 MG: 500 TABLET ORAL at 04:06

## 2025-06-21 RX ADMIN — MORPHINE SULFATE 2 MG: 2 INJECTION, SOLUTION INTRAMUSCULAR; INTRAVENOUS at 05:06

## 2025-06-21 NOTE — ED PROVIDER NOTES
Encounter Date: 2025       History     Chief Complaint   Patient presents with    Headache    Hypertension     Patient presents with headache, states her blood pressure is uncontrolled, patient has chronic kidney disease and is on hemodialysis, scheduled for dialysis this morning.  No neurologic deficits.      Review of patient's allergies indicates:   Allergen Reactions    Ceftazidime Itching    Iron sucrose Itching    Sodium ferric gluconat-sucrose Itching    Cefazolin Itching    Gentamicin Itching     Not allergic     Iron Itching    Soap Hives    Sodium ferric gluconate complex Itching    Sucrose Itching    Adhesive Hives and Rash     No surgical tape    Povidone-iodine Itching, Rash and Hives     Burning to skin  blisters       Past Medical History:   Diagnosis Date    Digestive disorder     ESRD on peritoneal dialysis     Human immunodeficiency virus (HIV) disease     Hypertension     Hypothyroidism      Past Surgical History:   Procedure Laterality Date     SECTION      INSERTION, CATHETER, TUNNELED N/A 2023    Procedure: INSERTION, CATHETER, TUNNELED;  Surgeon: Jules Álvarez DO;  Location: [facility] OR;  Service: General;  Laterality: N/A;    KIDNEY TRANSPLANT  2015    [facility]    KNEE SURGERY      PARATHYROIDECTOMY      PERITONEAL CATHETER REMOVAL N/A 2023    Procedure: REMOVAL, CATHETER, DIALYSIS, PERITONEAL;  Surgeon: Jules Álvarez DO;  Location: [facility] OR;  Service: General;  Laterality: N/A;    ROBOT-ASSISTED LAPAROSCOPIC LYSIS OF ADHESIONS USING DA ANDREZ XI N/A 2024    Procedure: KAROL ROBOTIC LYSIS, ADHESIONS;  Surgeon: Jules Álvarez DO;  Location: [facility] OR;  Service: General;  Laterality: N/A;    ROBOT-ASSISTED REPAIR OF VENTRAL HERNIA USING DA ANDREZ XI N/A 2024    Procedure: KAROL ROBOTIC REPAIR, HERNIA, VENTRAL;  Surgeon: Jules Álvarez DO;  Location: [facility] OR;  Service: General;  Laterality: N/A;    THYROIDECTOMY       Family History    Problem Relation Name Age of Onset    Cancer Mother      Vision loss Mother      Heart disease Father      Depression Sister      No Known Problems Brother      Gout Son       Social History[1]  Review of Systems   Constitutional: Negative.    HENT: Negative.     Eyes: Negative.    Respiratory: Negative.  Negative for shortness of breath.    Cardiovascular: Negative.  Negative for chest pain and leg swelling.   Gastrointestinal: Negative.    Genitourinary:         Patient is on hemodialysis for chronic kidney failure   Musculoskeletal: Negative.    Skin: Negative.    Neurological:  Positive for headaches. Negative for dizziness, tremors, seizures, syncope, facial asymmetry, speech difficulty, weakness, light-headedness and numbness.   Psychiatric/Behavioral: Negative.     All other systems reviewed and are negative.      Physical Exam     Initial Vitals [06/21/25 0433]   BP Pulse Resp Temp SpO2   (!) 224/113 (!) 56 20 98.2 °F (36.8 °C) 99 %      MAP       --         Physical Exam    Nursing note and vitals reviewed.  Constitutional: She appears well-developed and well-nourished. No distress.   HENT:   Head: Atraumatic.   Right Ear: External ear normal.   Left Ear: External ear normal.   Nose: Nose normal. Mouth/Throat: Oropharynx is clear and moist.   Eyes: Conjunctivae and EOM are normal. Pupils are equal, round, and reactive to light.   Neck: Neck supple. No JVD present.   Normal range of motion.  Cardiovascular:  Normal rate, regular rhythm, normal heart sounds and intact distal pulses.           No murmur heard.  Pulmonary/Chest: Breath sounds normal. No stridor. No respiratory distress. She has no wheezes. She has no rhonchi. She has no rales.   Abdominal: Abdomen is soft. Bowel sounds are normal. She exhibits no distension. There is no abdominal tenderness.   Musculoskeletal:         General: No tenderness or edema. Normal range of motion.      Cervical back: Normal range of motion and neck supple.      Lymphadenopathy:     She has no cervical adenopathy.   Neurological: She is alert and oriented to person, place, and time. She has normal strength. No cranial nerve deficit. GCS score is 15. GCS eye subscore is 4. GCS verbal subscore is 5. GCS motor subscore is 6.   Skin: Skin is warm and dry. Capillary refill takes less than 2 seconds. No rash noted. No erythema. No pallor.   Psychiatric: She has a normal mood and affect. Her behavior is normal.         Medical Screening Exam   See Full Note    ED Course   Procedures  Labs Reviewed - No data to display       Imaging Results    None          Medications   acetaminophen tablet 1,000 mg (1,000 mg Oral Given 6/21/25 0450)   cloNIDine tablet 0.3 mg (0.3 mg Oral Given 6/21/25 1871)   hydrALAZINE injection 20 mg (20 mg Intravenous Given 6/21/25 8851)   morphine injection 2 mg (2 mg Intravenous Given 6/21/25 4535)   prochlorperazine injection Soln 5 mg (5 mg Intravenous Given 6/21/25 3236)     Medical Decision Making  Differential diagnosis includes headache, uncontrolled hypertension, history of chronic renal failure, hemodialysis patient.    Patient was treated with 0.3 mg p.o. clonidine and hydralazine 20 mg IV.  Blood pressure improved to 172/93.  She was also given Tylenol for headache.  Headache improved.  Patient still reports significant headache and she was further treated with morphine 2 mg IV along with prochlorperazine 5 mg IV.  Stable for discharge to go to dialysis this morning.  Recommend follow up with primary physician regarding uncontrolled hypertension.  Advise patient be sure to take all of her medications as directed.    Risk  OTC drugs.  Prescription drug management.                                      Clinical Impression:   Final diagnoses:  [I10] Uncontrolled hypertension (Primary)  [G44.1] Other vascular headache  [N18.6, Z99.2] End-stage renal disease on hemodialysis        ED Disposition Condition    Discharge Stable          ED  Prescriptions    None       Follow-up Information       Follow up With Specialties Details Why Contact Info    Peri Garrett MD Family Medicine Schedule an appointment as soon as possible for a visit on 6/24/2025 To recheck; sooner if worse, not improving, or if any new symptoms. [address] [place] [address]  [phone]                 [1]   Social History  Tobacco Use    Smoking status: Former    Smokeless tobacco: Never   Vaping Use    Vaping status: Some Days   Substance Use Topics    Alcohol use: Not Currently    Drug use: Never        Donnell Jones,   06/21/25 0583

## (undated) DEVICE — SUT STRATAFIX PDO 2-0 SH

## (undated) DEVICE — KIT ANTIFOG W/SPONG & FLUID

## (undated) DEVICE — SUT 0 VICRYL / UR6 (J603)

## (undated) DEVICE — SEAL UNIVERSAL 5MM-8MM XI

## (undated) DEVICE — DRAPE THREE-QTR REINF 53X77IN

## (undated) DEVICE — COVER PROBE WITH BAND 6X96IN

## (undated) DEVICE — Device

## (undated) DEVICE — GLOVE 6.0 PROTEXIS PI BLUE

## (undated) DEVICE — DRIVER NEEDLE SUTURECUT MEGA

## (undated) DEVICE — DRAPE INCISE IOBAN 2 23X23IN

## (undated) DEVICE — TAPE DRAPE STRIP CLSR 4.5X24IN

## (undated) DEVICE — SOL NACL IRR 1000ML BTL

## (undated) DEVICE — STAPLER SKIN ROTATING HEAD

## (undated) DEVICE — WARMER BLUE HEAT SCOPE 3-12MM

## (undated) DEVICE — KIT BASIC RUSH

## (undated) DEVICE — DRESSING TRANS 4X4 TEGADERM

## (undated) DEVICE — GLOVE PROTEXIS PI SYN SURG 8.0

## (undated) DEVICE — APPLICATOR CHLORAPREP ORN 26ML

## (undated) DEVICE — OBTURATOR BLADELESS 8MM XI CLR

## (undated) DEVICE — DRAPE STERI INCISE 17X23IN

## (undated) DEVICE — GLOVE SENSICARE PI SURG 8

## (undated) DEVICE — STRIP MEDI WND CLSR 1/2X4IN

## (undated) DEVICE — SUT V-LOC 180 GRN GS-21 12IN

## (undated) DEVICE — SUT SILK 2.0 BLK 18

## (undated) DEVICE — SCISSOR HOT SHEARS XI

## (undated) DEVICE — SYR 10CC LUER LOCK

## (undated) DEVICE — GLOVE BIOGEL PIMICRO INDIC 8.5

## (undated) DEVICE — GLOVE PROTEXIS PI SYN SURG 6.5

## (undated) DEVICE — SYR 30CC LUER LOCK

## (undated) DEVICE — GOWN NONREINF SET-IN SLV 2XL

## (undated) DEVICE — GLOVE SENSICARE PI GRN 6.5

## (undated) DEVICE — GLOVE PROTEXIS PI SYN SURG 7

## (undated) DEVICE — GLOVE SENSICARE PI SURG 6.5

## (undated) DEVICE — GLOVE 8.5 PROTEXIS PI BLUE

## (undated) DEVICE — COVER PROXIMA MAYO STAND

## (undated) DEVICE — BLADE SURG CARBON STEEL SZ11

## (undated) DEVICE — FORCEP FENESTRATED BIPOLAR

## (undated) DEVICE — COTTONBALL LARGE STRL

## (undated) DEVICE — SUT MONOCYRL 4-0 PS2 UND

## (undated) DEVICE — SUT VLOC 180 3-0 GRN B-20

## (undated) DEVICE — SUT 3-0 VICRYL / SH (J416)

## (undated) DEVICE — NDL INSUFFLATION VERRES 120MM

## (undated) DEVICE — COVER TIP CURVED SCISSORS XI

## (undated) DEVICE — BAG RECTANGLE RBBRBND 30X36IN

## (undated) DEVICE — DRAPE ARM DAVINCI XI

## (undated) DEVICE — SET PNEUMOCLEAR HEAT HUM SE HF

## (undated) DEVICE — IRRIGATOR ENDOSCOPY DISP.